# Patient Record
Sex: FEMALE | Race: BLACK OR AFRICAN AMERICAN | NOT HISPANIC OR LATINO | Employment: PART TIME | URBAN - METROPOLITAN AREA
[De-identification: names, ages, dates, MRNs, and addresses within clinical notes are randomized per-mention and may not be internally consistent; named-entity substitution may affect disease eponyms.]

---

## 2018-05-14 ENCOUNTER — HISTORICAL (OUTPATIENT)
Dept: LAB | Facility: HOSPITAL | Age: 18
End: 2018-05-14

## 2018-05-14 LAB
B-HCG SERPL QL: NEGATIVE
HIV 1+2 AB+HIV1 P24 AG SERPL QL IA: NEGATIVE
T PALLIDUM AB SER QL: NONREACTIVE

## 2019-11-15 ENCOUNTER — HISTORICAL (OUTPATIENT)
Dept: ADMINISTRATIVE | Facility: HOSPITAL | Age: 19
End: 2019-11-15

## 2021-07-27 ENCOUNTER — HISTORICAL (OUTPATIENT)
Dept: LAB | Facility: HOSPITAL | Age: 21
End: 2021-07-27

## 2021-07-27 LAB
ABS NEUT (OLG): 2.5 X10(3)/MCL (ref 1.5–6.9)
ALBUMIN SERPL-MCNC: 4 GM/DL (ref 3.5–5)
ALBUMIN/GLOB SERPL: 1.1 RATIO (ref 1.1–2)
ALP SERPL-CCNC: 95 UNIT/L (ref 40–150)
ALT SERPL-CCNC: 15 UNIT/L (ref 0–55)
APPEARANCE, UA: CLEAR
AST SERPL-CCNC: 20 UNIT/L (ref 5–34)
BASOPHILS # BLD AUTO: 0 X10(3)/MCL (ref 0–0.1)
BASOPHILS NFR BLD AUTO: 0 % (ref 0–1)
BILIRUB SERPL-MCNC: 0.5 MG/DL
BILIRUB UR QL STRIP: NEGATIVE
BILIRUBIN DIRECT+TOT PNL SERPL-MCNC: 0.2 MG/DL (ref 0–0.5)
BILIRUBIN DIRECT+TOT PNL SERPL-MCNC: 0.3 MG/DL (ref 0–0.8)
BUN SERPL-MCNC: 8 MG/DL (ref 7–18.7)
CALCIUM SERPL-MCNC: 9.8 MG/DL (ref 8.4–10.2)
CHLORIDE SERPL-SCNC: 105 MMOL/L (ref 98–107)
CHOLEST SERPL-MCNC: 150 MG/DL
CHOLEST/HDLC SERPL: 3 {RATIO} (ref 0–5)
CO2 SERPL-SCNC: 29 MMOL/L (ref 22–29)
COLOR UR: YELLOW
CREAT SERPL-MCNC: 0.78 MG/DL (ref 0.55–1.02)
DEPRECATED CALCIDIOL+CALCIFEROL SERPL-MC: 27.3 NG/ML (ref 30–80)
EOSINOPHIL # BLD AUTO: 0.2 X10(3)/MCL (ref 0–0.6)
EOSINOPHIL NFR BLD AUTO: 4 % (ref 0–5)
ERYTHROCYTE [DISTWIDTH] IN BLOOD BY AUTOMATED COUNT: 13 % (ref 11.5–17)
GLOBULIN SER-MCNC: 3.7 GM/DL (ref 2.4–3.5)
GLUCOSE (UA): NEGATIVE MG/DL
GLUCOSE SERPL-MCNC: 92 MG/DL (ref 74–100)
HCT VFR BLD AUTO: 39.8 % (ref 36–48)
HDLC SERPL-MCNC: 46 MG/DL (ref 35–60)
HGB BLD-MCNC: 12.8 GM/DL (ref 12–16)
HGB UR QL STRIP: NEGATIVE
IMM GRANULOCYTES # BLD AUTO: 0.01 10*3/UL (ref 0–0.02)
IMM GRANULOCYTES NFR BLD AUTO: 0.2 % (ref 0–0.43)
KETONES UR QL STRIP: NEGATIVE MG/DL
LDLC SERPL CALC-MCNC: 88 MG/DL (ref 50–140)
LEUKOCYTE ESTERASE UR QL STRIP: NEGATIVE
LYMPHOCYTES # BLD AUTO: 2.5 X10(3)/MCL (ref 0.5–4.1)
LYMPHOCYTES NFR BLD AUTO: 43 % (ref 15–40)
MCH RBC QN AUTO: 28 PG (ref 27–34)
MCHC RBC AUTO-ENTMCNC: 32 GM/DL (ref 31–36)
MCV RBC AUTO: 87 FL (ref 80–99)
MONOCYTES # BLD AUTO: 0.5 X10(3)/MCL (ref 0–1.1)
MONOCYTES NFR BLD AUTO: 8 % (ref 4–12)
NEUTROPHILS # BLD AUTO: 2.5 X10(3)/MCL (ref 1.5–6.9)
NEUTROPHILS NFR BLD AUTO: 44 % (ref 43–75)
NITRITE UR QL STRIP: NEGATIVE
PH UR STRIP: 7 [PH] (ref 4.6–8)
PLATELET # BLD AUTO: 293 X10(3)/MCL (ref 140–400)
PMV BLD AUTO: 11.2 FL (ref 6.8–10)
POTASSIUM SERPL-SCNC: 3.9 MMOL/L (ref 3.5–5.1)
PROT SERPL-MCNC: 7.7 GM/DL (ref 6.4–8.3)
PROT UR QL STRIP: NEGATIVE MG/DL
RBC # BLD AUTO: 4.56 X10(6)/MCL (ref 4.2–5.4)
SODIUM SERPL-SCNC: 138 MMOL/L (ref 136–145)
SP GR UR STRIP: 1.02 (ref 1–1.03)
TRIGL SERPL-MCNC: 79 MG/DL (ref 37–140)
TSH SERPL-ACNC: 1.36 UIU/ML (ref 0.35–4.94)
UROBILINOGEN UR STRIP-ACNC: 2 EU/DL
VLDLC SERPL CALC-MCNC: 16 MG/DL
WBC # SPEC AUTO: 5.8 X10(3)/MCL (ref 4.5–11.5)

## 2022-07-12 ENCOUNTER — HOSPITAL ENCOUNTER (EMERGENCY)
Facility: HOSPITAL | Age: 22
Discharge: HOME | End: 2022-07-13
Attending: STUDENT IN AN ORGANIZED HEALTH CARE EDUCATION/TRAINING PROGRAM
Payer: MEDICAID

## 2022-07-12 DIAGNOSIS — F41.9 ANXIETY: Primary | ICD-10-CM

## 2022-07-12 LAB
ALBUMIN SERPL-MCNC: 3.7 GM/DL (ref 3.5–5)
ALBUMIN/GLOB SERPL: 0.8 RATIO (ref 1.1–2)
ALP SERPL-CCNC: 70 UNIT/L (ref 40–150)
ALT SERPL-CCNC: 10 UNIT/L (ref 0–55)
APPEARANCE UR: CLEAR
AST SERPL-CCNC: 16 UNIT/L (ref 5–34)
B-HCG SERPL QL: NEGATIVE
BACTERIA #/AREA URNS AUTO: ABNORMAL /HPF
BASOPHILS # BLD AUTO: 0.03 X10(3)/MCL (ref 0–0.2)
BASOPHILS NFR BLD AUTO: 0.4 %
BILIRUB UR QL STRIP.AUTO: NEGATIVE MG/DL
BILIRUBIN DIRECT+TOT PNL SERPL-MCNC: 0.2 MG/DL
BUN SERPL-MCNC: 10 MG/DL (ref 7–18.7)
CALCIUM SERPL-MCNC: 9.7 MG/DL (ref 8.4–10.2)
CHLORIDE SERPL-SCNC: 105 MMOL/L (ref 98–107)
CO2 SERPL-SCNC: 22 MMOL/L (ref 22–29)
COLOR UR AUTO: YELLOW
CREAT SERPL-MCNC: 1.03 MG/DL (ref 0.55–1.02)
D DIMER PPP IA.FEU-MCNC: 1.22 UG/ML FEU (ref 0–0.5)
EOSINOPHIL # BLD AUTO: 0.17 X10(3)/MCL (ref 0–0.9)
EOSINOPHIL NFR BLD AUTO: 2.4 %
ERYTHROCYTE [DISTWIDTH] IN BLOOD BY AUTOMATED COUNT: 13.2 % (ref 11.5–17)
GLOBULIN SER-MCNC: 4.4 GM/DL (ref 2.4–3.5)
GLUCOSE SERPL-MCNC: 107 MG/DL (ref 74–100)
GLUCOSE UR QL STRIP.AUTO: NEGATIVE MG/DL
HCT VFR BLD AUTO: 37.3 % (ref 37–47)
HGB BLD-MCNC: 12.1 GM/DL (ref 12–16)
IMM GRANULOCYTES # BLD AUTO: 0.01 X10(3)/MCL (ref 0–0.04)
IMM GRANULOCYTES NFR BLD AUTO: 0.1 %
KETONES UR QL STRIP.AUTO: ABNORMAL MG/DL
LEUKOCYTE ESTERASE UR QL STRIP.AUTO: NEGATIVE UNIT/L
LYMPHOCYTES # BLD AUTO: 2.76 X10(3)/MCL (ref 0.6–4.6)
LYMPHOCYTES NFR BLD AUTO: 38.9 %
MCH RBC QN AUTO: 26.8 PG (ref 27–31)
MCHC RBC AUTO-ENTMCNC: 32.4 MG/DL (ref 33–36)
MCV RBC AUTO: 82.7 FL (ref 80–94)
MONOCYTES # BLD AUTO: 0.43 X10(3)/MCL (ref 0.1–1.3)
MONOCYTES NFR BLD AUTO: 6.1 %
MUCOUS THREADS URNS QL MICRO: ABNORMAL /LPF
NEUTROPHILS # BLD AUTO: 3.7 X10(3)/MCL (ref 2.1–9.2)
NEUTROPHILS NFR BLD AUTO: 52.1 %
NITRITE UR QL STRIP.AUTO: NEGATIVE
PH UR STRIP.AUTO: 6 [PH]
PLATELET # BLD AUTO: 313 X10(3)/MCL (ref 130–400)
PMV BLD AUTO: 11.3 FL (ref 7.4–10.4)
POTASSIUM SERPL-SCNC: 3.6 MMOL/L (ref 3.5–5.1)
PROT SERPL-MCNC: 8.1 GM/DL (ref 6.4–8.3)
PROT UR QL STRIP.AUTO: ABNORMAL MG/DL
RBC # BLD AUTO: 4.51 X10(6)/MCL (ref 4.2–5.4)
RBC #/AREA URNS AUTO: ABNORMAL /HPF
RBC UR QL AUTO: NEGATIVE UNIT/L
SODIUM SERPL-SCNC: 139 MMOL/L (ref 136–145)
SP GR UR STRIP.AUTO: 1.02
SQUAMOUS #/AREA URNS AUTO: ABNORMAL /HPF
TSH SERPL-ACNC: 1.14 UIU/ML (ref 0.35–4.94)
UROBILINOGEN UR STRIP-ACNC: 2 MG/DL
WBC # SPEC AUTO: 7.1 X10(3)/MCL (ref 4.5–11.5)
WBC #/AREA URNS AUTO: ABNORMAL /HPF

## 2022-07-12 PROCEDURE — 81025 URINE PREGNANCY TEST: CPT | Performed by: STUDENT IN AN ORGANIZED HEALTH CARE EDUCATION/TRAINING PROGRAM

## 2022-07-12 PROCEDURE — 85379 FIBRIN DEGRADATION QUANT: CPT | Performed by: STUDENT IN AN ORGANIZED HEALTH CARE EDUCATION/TRAINING PROGRAM

## 2022-07-12 PROCEDURE — 85025 COMPLETE CBC W/AUTO DIFF WBC: CPT | Performed by: STUDENT IN AN ORGANIZED HEALTH CARE EDUCATION/TRAINING PROGRAM

## 2022-07-12 PROCEDURE — 84443 ASSAY THYROID STIM HORMONE: CPT | Performed by: STUDENT IN AN ORGANIZED HEALTH CARE EDUCATION/TRAINING PROGRAM

## 2022-07-12 PROCEDURE — 80053 COMPREHEN METABOLIC PANEL: CPT | Performed by: STUDENT IN AN ORGANIZED HEALTH CARE EDUCATION/TRAINING PROGRAM

## 2022-07-12 PROCEDURE — 63600175 PHARM REV CODE 636 W HCPCS: Performed by: STUDENT IN AN ORGANIZED HEALTH CARE EDUCATION/TRAINING PROGRAM

## 2022-07-12 PROCEDURE — 99285 EMERGENCY DEPT VISIT HI MDM: CPT | Mod: 25

## 2022-07-12 PROCEDURE — 81001 URINALYSIS AUTO W/SCOPE: CPT | Performed by: STUDENT IN AN ORGANIZED HEALTH CARE EDUCATION/TRAINING PROGRAM

## 2022-07-12 PROCEDURE — 36415 COLL VENOUS BLD VENIPUNCTURE: CPT | Performed by: STUDENT IN AN ORGANIZED HEALTH CARE EDUCATION/TRAINING PROGRAM

## 2022-07-12 RX ADMIN — SODIUM CHLORIDE, POTASSIUM CHLORIDE, SODIUM LACTATE AND CALCIUM CHLORIDE 1000 ML: 600; 310; 30; 20 INJECTION, SOLUTION INTRAVENOUS at 11:07

## 2022-07-13 VITALS
RESPIRATION RATE: 21 BRPM | SYSTOLIC BLOOD PRESSURE: 131 MMHG | HEIGHT: 66 IN | TEMPERATURE: 99 F | BODY MASS INDEX: 26.52 KG/M2 | DIASTOLIC BLOOD PRESSURE: 72 MMHG | HEART RATE: 93 BPM | WEIGHT: 165 LBS | OXYGEN SATURATION: 99 %

## 2022-07-13 PROCEDURE — 25500020 PHARM REV CODE 255: Performed by: STUDENT IN AN ORGANIZED HEALTH CARE EDUCATION/TRAINING PROGRAM

## 2022-07-13 RX ORDER — HYDROXYZINE PAMOATE 25 MG/1
25 CAPSULE ORAL EVERY 6 HOURS PRN
Qty: 20 CAPSULE | Refills: 0 | Status: SHIPPED | OUTPATIENT
Start: 2022-07-13

## 2022-07-13 RX ADMIN — IOPAMIDOL 100 ML: 755 INJECTION, SOLUTION INTRAVENOUS at 12:07

## 2022-07-13 NOTE — ED PROVIDER NOTES
Encounter Date: 7/12/2022       History     Chief Complaint   Patient presents with    Shortness of Breath     Patient experiencing off and on SOB for 1 month. Patient suspects anxiety.      The history is provided by the patient.   Shortness of Breath  This is a recurrent problem. The problem occurs frequently.The current episode started more than 1 week ago. The problem has not changed since onset.Pertinent negatives include no fever, no cough, no wheezing, no chest pain, no vomiting and no abdominal pain. The problem's precipitants include emotional upset. She has tried nothing for the symptoms. The treatment provided no relief. She has had no prior ED visits.       22-year-old female with a past history of anxiety presents emergency department for chest tightness and shortness of breath.  Patient states she thinks is her anxiety although is concerned.  States that it happens intermittently it has been going on for last few months.  Denies any chest pain.  No abdominal pain, nausea vomiting or diarrhea.  She is on birth control patches.    Review of patient's allergies indicates:  No Known Allergies  Past Medical History:   Diagnosis Date    Anxiety disorder, unspecified     Insomnia      History reviewed. No pertinent surgical history.  History reviewed. No pertinent family history.  Social History     Tobacco Use    Smoking status: Never Smoker    Smokeless tobacco: Never Used   Substance Use Topics    Alcohol use: Never    Drug use: Yes     Frequency: 2.0 times per week     Types: Marijuana     Review of Systems   Constitutional: Negative for fever.   Respiratory: Positive for shortness of breath. Negative for cough and wheezing.    Cardiovascular: Negative for chest pain.   Gastrointestinal: Negative for abdominal pain and vomiting.   Psychiatric/Behavioral: The patient is nervous/anxious.    All other systems reviewed and are negative.      Physical Exam     Initial Vitals [07/12/22 2152]   BP Pulse  Resp Temp SpO2   (!) 150/86 (!) 121 18 99 °F (37.2 °C) 100 %      MAP       --         Physical Exam    Nursing note and vitals reviewed.  Constitutional: She appears well-developed and well-nourished. No distress.   Cardiovascular: Normal rate and regular rhythm.   Pulmonary/Chest: Breath sounds normal. No respiratory distress. She exhibits no tenderness.   Abdominal: Abdomen is soft. Bowel sounds are normal. There is no abdominal tenderness.   Musculoskeletal:         General: Normal range of motion.     Neurological: She is alert and oriented to person, place, and time. GCS score is 15. GCS eye subscore is 4. GCS verbal subscore is 5. GCS motor subscore is 6.   Skin: Skin is warm. Capillary refill takes less than 2 seconds.   Psychiatric: She has a normal mood and affect. Thought content normal.         ED Course   Procedures  Labs Reviewed   COMPREHENSIVE METABOLIC PANEL - Abnormal; Notable for the following components:       Result Value    Glucose Level 107 (*)     Creatinine 1.03 (*)     Globulin 4.4 (*)     Albumin/Globulin Ratio 0.8 (*)     All other components within normal limits   URINALYSIS, REFLEX TO URINE CULTURE - Abnormal; Notable for the following components:    Protein, UA Trace (*)     Ketones, UA Trace (*)     Urobilinogen, UA 2.0 (*)     All other components within normal limits   D DIMER, QUANTITATIVE - Abnormal; Notable for the following components:    D-Dimer 1.22 (*)     All other components within normal limits   CBC WITH DIFFERENTIAL - Abnormal; Notable for the following components:    MCH 26.8 (*)     MCHC 32.4 (*)     MPV 11.3 (*)     All other components within normal limits   URINALYSIS, MICROSCOPIC - Abnormal; Notable for the following components:    Bacteria, UA Few (*)     Mucous, UA Small (*)     Squamous Epithelial Cells, UA Moderate (*)     All other components within normal limits   PREGNANCY TEST, URINE RAPID - Normal   TSH - Normal   CBC W/ AUTO DIFFERENTIAL    Narrative:      The following orders were created for panel order CBC auto differential.  Procedure                               Abnormality         Status                     ---------                               -----------         ------                     CBC with Differential[[id number]]        Abnormal            Final result                 Please view results for these tests on the individual orders.          Imaging Results          CTA Chest Non-Coronary - PE Study (Preliminary result)  Result time 07/13/22 00:27:28    Preliminary result by Roverto Garrett Jr., MD (07/13/22 00:27:28)                 Narrative:    START OF REPORT:  Technique: CT Scan of the chest was performed with intravenous contrast with direct axial images as well as sagittal and coronal reconstruction images.    Dosage Information: Automated Exposure Control was utilized.    Comparison: None.    Clinical History: Chest pain, SOB.    Findings:  Contrast: The contrast bolus is somewhat sub-optimal which decreases sensitivity and specificity for subtle distal peripheral pulmonary emboli.  Mediastinum: The mediastinal structures are within normal limits.  Heart: The heart appears unremarkable.  Aorta: Unremarkable appearing aorta.  Pulmonary Arteries: No filling defects are seen in the pulmonary arteries to suggest pulmonary embolus to the sensitivity of the study.  Lungs: The lungs are clear with no focal infiltrate or airspace disease.  Pleura: No effusions or pneumothorax are identified.  Bony Structures: The visualized bony structures appear unremarkable.  Abdomen: The visualized upper abdominal organs appear unremarkable.      Impression:  1. No filling defects are seen in the pulmonary arteries to suggest pulmonary embolus to the sensitivity of the study.  2. No acute intrathoracic process identified. Details as above.                                 X-Ray Chest AP Portable (Preliminary result)  Result time 07/12/22 22:29:24    ED  Interpretation by Jeovanny Flower MD (07/12/22 22:29:24, [facility]  Emergency Dept, Emergency Medicine)    Lung clean with no consolidations.  No pneumothorax subcutaneous emphysema.  Heart size is normal limits.                               Medications   lactated ringers bolus 1,000 mL (1,000 mLs Intravenous New Bag 7/12/22 1952)   iopamidoL (ISOVUE-370) injection 100 mL (100 mLs Intravenous Given 7/13/22 0027)     Medical Decision Making:   Differential Diagnosis:     Anxiety, PE             ED Course as of 07/13/22 0110   Wed Jul 13, 2022   0107 Patient resting comfortably in bed no acute distress.  Vital signs are stable and patient is along tachycardic.  Laboratory results all within normal limits except her elevated D-dimer.  CTA was performed with no evidence of PE.  Patient states that she no longer feels anxious is ready go home.  I informed I will give her some Vistaril to take as needed and she is to follow-up with her PCP to get on some type of anti anxiety medications and she understands.  Strict ER precautions given patient states full understanding. [BS]      ED Course User Index  [BS] Jeovanny Flower MD             Clinical Impression:   Final diagnoses:  [F41.9] Anxiety (Primary)          ED Disposition Condition    Discharge Stable        ED Prescriptions     Medication Sig Dispense Start Date End Date Auth. Provider    hydrOXYzine pamoate (VISTARIL) 25 MG Cap Take 1 capsule (25 mg total) by mouth every 6 (six) hours as needed (Anxiety). 20 capsule 7/13/2022  Jeovanny Flower MD        Follow-up Information     Follow up With Specialties Details Why Contact Info    Vivi Miller MD Family Medicine Schedule an appointment as soon as possible for a visit   [address] [place] [address]  [phone]      [facility] - Emergency Dept Emergency Medicine Go to  If symptoms worsen [address]  [facility] [address] [phone]           Jeovanny FARIAS  MD [place]  07/13/22 0110

## 2022-09-28 ENCOUNTER — LAB VISIT (OUTPATIENT)
Dept: LAB | Facility: HOSPITAL | Age: 22
End: 2022-09-28
Attending: NURSE PRACTITIONER
Payer: MEDICAID

## 2022-09-28 DIAGNOSIS — Z79.899 ENCOUNTER FOR LONG-TERM (CURRENT) USE OF OTHER MEDICATIONS: Primary | ICD-10-CM

## 2022-09-28 LAB
ALBUMIN SERPL-MCNC: 3.9 GM/DL (ref 3.5–5)
ALBUMIN/GLOB SERPL: 1 RATIO (ref 1.1–2)
ALP SERPL-CCNC: 85 UNIT/L (ref 40–150)
ALT SERPL-CCNC: 14 UNIT/L (ref 0–55)
AST SERPL-CCNC: 17 UNIT/L (ref 5–34)
BASOPHILS # BLD AUTO: 0.04 X10(3)/MCL (ref 0–0.2)
BASOPHILS NFR BLD AUTO: 0.5 %
BILIRUBIN DIRECT+TOT PNL SERPL-MCNC: 0.6 MG/DL
BUN SERPL-MCNC: 10 MG/DL (ref 7–18.7)
CALCIUM SERPL-MCNC: 9.9 MG/DL (ref 8.4–10.2)
CHLORIDE SERPL-SCNC: 104 MMOL/L (ref 98–107)
CHOLEST SERPL-MCNC: 186 MG/DL
CHOLEST/HDLC SERPL: 3 {RATIO} (ref 0–5)
CO2 SERPL-SCNC: 27 MMOL/L (ref 22–29)
CREAT SERPL-MCNC: 1.01 MG/DL (ref 0.55–1.02)
EOSINOPHIL # BLD AUTO: 0.31 X10(3)/MCL (ref 0–0.9)
EOSINOPHIL NFR BLD AUTO: 4.1 %
ERYTHROCYTE [DISTWIDTH] IN BLOOD BY AUTOMATED COUNT: 13.5 % (ref 11.5–17)
GFR SERPLBLD CREATININE-BSD FMLA CKD-EPI: >60 MLS/MIN/1.73/M2
GLOBULIN SER-MCNC: 3.9 GM/DL (ref 2.4–3.5)
GLUCOSE SERPL-MCNC: 81 MG/DL (ref 74–100)
HCT VFR BLD AUTO: 39.1 % (ref 37–47)
HDLC SERPL-MCNC: 59 MG/DL (ref 35–60)
HGB BLD-MCNC: 12.5 GM/DL (ref 12–16)
IMM GRANULOCYTES # BLD AUTO: 0.02 X10(3)/MCL (ref 0–0.04)
IMM GRANULOCYTES NFR BLD AUTO: 0.3 %
LDLC SERPL CALC-MCNC: 110 MG/DL (ref 50–140)
LYMPHOCYTES # BLD AUTO: 3.47 X10(3)/MCL (ref 0.6–4.6)
LYMPHOCYTES NFR BLD AUTO: 45.7 %
MCH RBC QN AUTO: 27 PG (ref 27–31)
MCHC RBC AUTO-ENTMCNC: 32 MG/DL (ref 33–36)
MCV RBC AUTO: 84.4 FL (ref 80–94)
MONOCYTES # BLD AUTO: 0.49 X10(3)/MCL (ref 0.1–1.3)
MONOCYTES NFR BLD AUTO: 6.4 %
NEUTROPHILS # BLD AUTO: 3.3 X10(3)/MCL (ref 2.1–9.2)
NEUTROPHILS NFR BLD AUTO: 43 %
PLATELET # BLD AUTO: 331 X10(3)/MCL (ref 130–400)
PMV BLD AUTO: 11.8 FL (ref 7.4–10.4)
POTASSIUM SERPL-SCNC: 4.2 MMOL/L (ref 3.5–5.1)
PROT SERPL-MCNC: 7.8 GM/DL (ref 6.4–8.3)
RBC # BLD AUTO: 4.63 X10(6)/MCL (ref 4.2–5.4)
SODIUM SERPL-SCNC: 138 MMOL/L (ref 136–145)
TRIGL SERPL-MCNC: 87 MG/DL (ref 37–140)
TSH SERPL-ACNC: 1.98 UIU/ML (ref 0.35–4.94)
VLDLC SERPL CALC-MCNC: 17 MG/DL
WBC # SPEC AUTO: 7.6 X10(3)/MCL (ref 4.5–11.5)

## 2022-09-28 PROCEDURE — 80365 DRUG SCREENING OXYCODONE: CPT

## 2022-09-28 PROCEDURE — 36415 COLL VENOUS BLD VENIPUNCTURE: CPT

## 2022-09-28 PROCEDURE — 80053 COMPREHEN METABOLIC PANEL: CPT

## 2022-09-28 PROCEDURE — 84443 ASSAY THYROID STIM HORMONE: CPT

## 2022-09-28 PROCEDURE — 80061 LIPID PANEL: CPT

## 2022-09-28 PROCEDURE — 85025 COMPLETE CBC W/AUTO DIFF WBC: CPT

## 2022-10-10 LAB — MAYO GENERIC ORDERABLE RESULT: NORMAL

## 2022-12-03 ENCOUNTER — HOSPITAL ENCOUNTER (EMERGENCY)
Facility: HOSPITAL | Age: 22
Discharge: HOME | End: 2022-12-03
Attending: INTERNAL MEDICINE
Payer: MEDICAID

## 2022-12-03 VITALS
HEART RATE: 72 BPM | RESPIRATION RATE: 16 BRPM | OXYGEN SATURATION: 100 % | TEMPERATURE: 100 F | BODY MASS INDEX: 26.95 KG/M2 | WEIGHT: 167 LBS | SYSTOLIC BLOOD PRESSURE: 140 MMHG | DIASTOLIC BLOOD PRESSURE: 87 MMHG

## 2022-12-03 DIAGNOSIS — K52.9 GASTROENTERITIS: Primary | ICD-10-CM

## 2022-12-03 LAB
ALBUMIN SERPL-MCNC: 3.7 GM/DL (ref 3.5–5)
ALBUMIN/GLOB SERPL: 0.9 RATIO (ref 1.1–2)
ALP SERPL-CCNC: 67 UNIT/L (ref 40–150)
ALT SERPL-CCNC: 9 UNIT/L (ref 0–55)
AMYLASE SERPL-CCNC: 77 UNIT/L (ref 25–125)
APPEARANCE UR: CLEAR
AST SERPL-CCNC: 16 UNIT/L (ref 5–34)
B-HCG SERPL QL: NEGATIVE
BACTERIA #/AREA URNS AUTO: ABNORMAL /HPF
BASOPHILS # BLD AUTO: 0.02 X10(3)/MCL (ref 0–0.2)
BASOPHILS NFR BLD AUTO: 0.3 %
BILIRUB UR QL STRIP.AUTO: NEGATIVE MG/DL
BILIRUBIN DIRECT+TOT PNL SERPL-MCNC: 0.5 MG/DL
BUN SERPL-MCNC: 8 MG/DL (ref 7–18.7)
CALCIUM SERPL-MCNC: 9.4 MG/DL (ref 8.4–10.2)
CHLORIDE SERPL-SCNC: 106 MMOL/L (ref 98–107)
CO2 SERPL-SCNC: 27 MMOL/L (ref 22–29)
COLOR UR AUTO: YELLOW
CREAT SERPL-MCNC: 0.86 MG/DL (ref 0.55–1.02)
EOSINOPHIL # BLD AUTO: 0.18 X10(3)/MCL (ref 0–0.9)
EOSINOPHIL NFR BLD AUTO: 2.5 %
ERYTHROCYTE [DISTWIDTH] IN BLOOD BY AUTOMATED COUNT: 12.9 % (ref 11.5–17)
FLUAV AG UPPER RESP QL IA.RAPID: NOT DETECTED
FLUBV AG UPPER RESP QL IA.RAPID: NOT DETECTED
GFR SERPLBLD CREATININE-BSD FMLA CKD-EPI: >60 MLS/MIN/1.73/M2
GLOBULIN SER-MCNC: 4.2 GM/DL (ref 2.4–3.5)
GLUCOSE SERPL-MCNC: 78 MG/DL (ref 74–100)
GLUCOSE UR QL STRIP.AUTO: NEGATIVE MG/DL
HCT VFR BLD AUTO: 35.4 % (ref 37–47)
HGB BLD-MCNC: 11.7 GM/DL (ref 12–16)
IMM GRANULOCYTES # BLD AUTO: 0.02 X10(3)/MCL (ref 0–0.04)
IMM GRANULOCYTES NFR BLD AUTO: 0.3 %
KETONES UR QL STRIP.AUTO: NEGATIVE MG/DL
LEUKOCYTE ESTERASE UR QL STRIP.AUTO: ABNORMAL UNIT/L
LIPASE SERPL-CCNC: 43 U/L
LYMPHOCYTES # BLD AUTO: 2.61 X10(3)/MCL (ref 0.6–4.6)
LYMPHOCYTES NFR BLD AUTO: 35.9 %
MCH RBC QN AUTO: 27.5 PG (ref 27–31)
MCHC RBC AUTO-ENTMCNC: 33.1 MG/DL (ref 33–36)
MCV RBC AUTO: 83.1 FL (ref 80–94)
MONOCYTES # BLD AUTO: 0.53 X10(3)/MCL (ref 0.1–1.3)
MONOCYTES NFR BLD AUTO: 7.3 %
MUCOUS THREADS URNS QL MICRO: ABNORMAL /LPF
NEUTROPHILS # BLD AUTO: 3.9 X10(3)/MCL (ref 2.1–9.2)
NEUTROPHILS NFR BLD AUTO: 53.7 %
NITRITE UR QL STRIP.AUTO: NEGATIVE
PH UR STRIP.AUTO: 6.5 [PH]
PLATELET # BLD AUTO: 316 X10(3)/MCL (ref 130–400)
PMV BLD AUTO: 11.2 FL (ref 7.4–10.4)
POTASSIUM SERPL-SCNC: 3.6 MMOL/L (ref 3.5–5.1)
PROT SERPL-MCNC: 7.9 GM/DL (ref 6.4–8.3)
PROT UR QL STRIP.AUTO: NEGATIVE MG/DL
RBC # BLD AUTO: 4.26 X10(6)/MCL (ref 4.2–5.4)
RBC #/AREA URNS AUTO: ABNORMAL /HPF
RBC UR QL AUTO: NEGATIVE UNIT/L
SARS-COV-2 RNA RESP QL NAA+PROBE: NOT DETECTED
SODIUM SERPL-SCNC: 139 MMOL/L (ref 136–145)
SP GR UR STRIP.AUTO: 1.02
SQUAMOUS #/AREA URNS AUTO: ABNORMAL /HPF
UROBILINOGEN UR STRIP-ACNC: 2 MG/DL
WBC # SPEC AUTO: 7.3 X10(3)/MCL (ref 4.5–11.5)
WBC #/AREA URNS AUTO: ABNORMAL /HPF

## 2022-12-03 PROCEDURE — 99285 EMERGENCY DEPT VISIT HI MDM: CPT | Mod: 25

## 2022-12-03 PROCEDURE — 63600175 PHARM REV CODE 636 W HCPCS: Performed by: NURSE PRACTITIONER

## 2022-12-03 PROCEDURE — 83690 ASSAY OF LIPASE: CPT | Performed by: NURSE PRACTITIONER

## 2022-12-03 PROCEDURE — 81025 URINE PREGNANCY TEST: CPT | Performed by: NURSE PRACTITIONER

## 2022-12-03 PROCEDURE — 85025 COMPLETE CBC W/AUTO DIFF WBC: CPT | Performed by: NURSE PRACTITIONER

## 2022-12-03 PROCEDURE — 96374 THER/PROPH/DIAG INJ IV PUSH: CPT

## 2022-12-03 PROCEDURE — 81001 URINALYSIS AUTO W/SCOPE: CPT | Performed by: NURSE PRACTITIONER

## 2022-12-03 PROCEDURE — 25000003 PHARM REV CODE 250: Performed by: NURSE PRACTITIONER

## 2022-12-03 PROCEDURE — 82150 ASSAY OF AMYLASE: CPT | Performed by: NURSE PRACTITIONER

## 2022-12-03 PROCEDURE — 80053 COMPREHEN METABOLIC PANEL: CPT | Performed by: NURSE PRACTITIONER

## 2022-12-03 PROCEDURE — 0240U COVID/FLU A&B PCR: CPT | Performed by: NURSE PRACTITIONER

## 2022-12-03 PROCEDURE — 81003 URINALYSIS AUTO W/O SCOPE: CPT | Performed by: NURSE PRACTITIONER

## 2022-12-03 PROCEDURE — 96361 HYDRATE IV INFUSION ADD-ON: CPT

## 2022-12-03 RX ORDER — ONDANSETRON 4 MG/1
4 TABLET, FILM COATED ORAL EVERY 6 HOURS
Qty: 12 TABLET | Refills: 0 | Status: SHIPPED | OUTPATIENT
Start: 2022-12-03

## 2022-12-03 RX ORDER — ONDANSETRON 2 MG/ML
4 INJECTION INTRAMUSCULAR; INTRAVENOUS
Status: COMPLETED | OUTPATIENT
Start: 2022-12-03 | End: 2022-12-03

## 2022-12-03 RX ADMIN — ONDANSETRON 4 MG: 2 INJECTION INTRAMUSCULAR; INTRAVENOUS at 03:12

## 2022-12-03 RX ADMIN — SODIUM CHLORIDE 1000 ML: 9 INJECTION, SOLUTION INTRAVENOUS at 03:12

## 2022-12-03 NOTE — ED PROVIDER NOTES
2022         3:49 PM    Source of History:  History obtained from the patient.       Chief complaint:  From Nurse Triage:  Vomiting (C/o N/V/D x 3 days)    HISTORY OF PRESENT ILLNES:  Tony Galindo is a 22 y.o. female presenting with Vomiting (C/o N/V/D x 3 days)       Patient with history of depression anxiety ate some barbecue from her father this Thursday and started having nausea vomiting and diarrhea, next day she was still having nausea vomiting diarrhea and this morning still having diarrhea and she can not eat or drink anything so decided to come to the emergency room.    REVIEW OF SYSTEMS:   Constitutional symptoms:  No Fever. No Chills    Skin symptoms:  No Rash.    Eye symptoms:  No Visual disturbance reported.   ENMT symptoms:  No Sore throat,    Respiratory symptoms:  No Shortness of Breath, no Cough, no Wheezing.    Cardiovascular symptoms:  No Chest Pain, No Palpitations, No Tachycardia.    Gastrointestinal symptoms:  No Abdominal Pain, nausea vomiting and diarrhea after eating barbecue  Genitourinary symptoms:  No Dysuria,    Musculoskeletal symptoms:  No Back pain,    Neurologic symptoms:  No Headache, No Dizziness.    Psychiatric symptoms:  No Anxiety, No Depression, No Substance Abuse.              Additional review of systems information: Patient Denies Any Other Complaints.    All Other Systems Reviewed With Patient And Negative.    ALLEGIES:  Review of patient's allergies indicates:  No Known Allergies    MEDICINE LIST:  Current Outpatient Medications   Medication Instructions    hydrOXYzine pamoate (VISTARIL) 25 mg, Oral, Every 6 hours PRN    ondansetron (ZOFRAN) 4 mg, Oral, Every 6 hours        PMH:  As per HPI and below:    Reviewed and updated in chart.    PAST MEDICAL HISTORY:  Past Medical History:   Diagnosis Date    Anxiety disorder, unspecified     Depression     Insomnia         PAST SURGICAL HISTORY:  Past Surgical History:   Procedure Laterality Date     SECTION          SOCIAL HISTORY:  Social History     Tobacco Use    Smoking status: Former     Packs/day: 0.50     Types: Cigarettes    Smokeless tobacco: Never   Substance Use Topics    Alcohol use: Not Currently    Drug use: Yes     Frequency: 2.0 times per week     Types: Marijuana       FAMILY HISTORY:  Family History   Problem Relation Age of Onset    Hypertension Mother     Hypertension Father     Diabetes Father         PROBLEM LIST:  There is no problem list on file for this patient.       PHYSICAL EXAM:      ED Triage Vitals [12/03/22 1451]   BP (!) 150/98   Pulse 106   Resp 18   Temp 99.6 °F (37.6 °C)   SpO2 100 %        Vital Signs: Reviewed As In Chart.  General:  Alert, No Cardiorespiratory Distress Noted.   Skin: Normal For Ethnic Origin  Eye:  Extraocular Movements Are Intact.   ENT: Mucus membranes are moist.   Cardiovascular:  Regular Rate And Rhythm, No Murmur, No Pedal Edema.    Respiratory:  Respirations Nonlabored, No Respiratory Distress, Good Bilateral Air Entry, No Rales, No Rhonchi.    Musculoskeletal:  No Gross Deformity Noted.   Gastrointestinal:  Soft, Non Distended, Non Tenderness, Normal Bowel Sounds.    Neurological:  Alert And Oriented To Person, Place, Time, And Situation, Normal Motor Observed, Normal Speech Observed.    Psychiatric:  Cooperative, Appropriate Mood & Affect.    INITIAL IMPRESSION/ DIFFERENTIAL DX:      MEDICAL DECISION MAKING:      Reviewed Nurses Note. Reviewed Vital Signs.     Reviewed Pertinent old records, History and updated as necessary.    22 y.o. female with Vomiting (C/o N/V/D x 3 days)    Patient with nausea vomiting and diarrhea after eating barbecue, her examination does not have any major abnormality, will give her IV fluids and do blood work and decide further.    ED WORKUP AND COURSE:  ED ORDERS:  Orders Placed This Encounter   Procedures    CBC auto differential    Comprehensive metabolic panel    COVID/FLU A&B PCR    Amylase    Lipase    Urinalysis, Reflex to  Urine Culture Urine, Clean Catch    HCG Qualitative Urine    CBC with Differential    Urinalysis, Microscopic    Insert Saline lock IV       ED MEDICINES:  Medications   sodium chloride 0.9% bolus 1,000 mL (1,000 mLs Intravenous New Bag 12/3/22 1511)   ondansetron injection 4 mg (4 mg Intravenous Given 12/3/22 1510)                ED LABS ORDERED AND REVIEWED:  Admission on 12/03/2022   Component Date Value Ref Range Status    Sodium Level 12/03/2022 139  136 - 145 mmol/L Final    Potassium Level 12/03/2022 3.6  3.5 - 5.1 mmol/L Final    Chloride 12/03/2022 106  98 - 107 mmol/L Final    Carbon Dioxide 12/03/2022 27  22 - 29 mmol/L Final    Glucose Level 12/03/2022 78  74 - 100 mg/dL Final    Blood Urea Nitrogen 12/03/2022 8.0  7.0 - 18.7 mg/dL Final    Creatinine 12/03/2022 0.86  0.55 - 1.02 mg/dL Final    Calcium Level Total 12/03/2022 9.4  8.4 - 10.2 mg/dL Final    Protein Total 12/03/2022 7.9  6.4 - 8.3 gm/dL Final    Albumin Level 12/03/2022 3.7  3.5 - 5.0 gm/dL Final    Globulin 12/03/2022 4.2 (H)  2.4 - 3.5 gm/dL Final    Albumin/Globulin Ratio 12/03/2022 0.9 (L)  1.1 - 2.0 ratio Final    Bilirubin Total 12/03/2022 0.5  <=1.5 mg/dL Final    Alkaline Phosphatase 12/03/2022 67  40 - 150 unit/L Final    Alanine Aminotransferase 12/03/2022 9  0 - 55 unit/L Final    Aspartate Aminotransferase 12/03/2022 16  5 - 34 unit/L Final    eGFR 12/03/2022 >60  mls/min/1.73/m2 Final    Influenza A PCR 12/03/2022 Not Detected  Not Detected Final    Influenza B PCR 12/03/2022 Not Detected  Not Detected Final    SARS-CoV-2 PCR 12/03/2022 Not Detected  Not Detected Final    Amylase Level 12/03/2022 77  25 - 125 unit/L Final    Lipase Level 12/03/2022 43  <=60 U/L Final    Color, UA 12/03/2022 Yellow  Yellow, Light-Yellow, Dark Yellow, Regina, Straw Final    Appearance,  12/03/2022 Clear  Clear Final    Specific Gravity,  12/03/2022 1.025   Final    pH,  12/03/2022 6.5  5.0 - 8.5 Final    Protein,  12/03/2022 Negative   Negative mg/dL Final    Glucose, UA 12/03/2022 Negative  Negative, Normal mg/dL Final    Ketones, UA 12/03/2022 Negative  Negative mg/dL Final    Blood, UA 12/03/2022 Negative  Negative unit/L Final    Bilirubin, UA 12/03/2022 Negative  Negative mg/dL Final    Urobilinogen, UA 12/03/2022 2.0 (A)  0.2, 1.0, Normal mg/dL Final    Nitrites, UA 12/03/2022 Negative  Negative Final    Leukocyte Esterase, UA 12/03/2022 Trace (A)  Negative unit/L Final    Beta hCG Qualitative, Urine 12/03/2022 Negative  Negative Final    WBC 12/03/2022 7.3  4.5 - 11.5 x10(3)/mcL Final    RBC 12/03/2022 4.26  4.20 - 5.40 x10(6)/mcL Final    Hgb 12/03/2022 11.7 (L)  12.0 - 16.0 gm/dL Final    Hct 12/03/2022 35.4 (L)  37.0 - 47.0 % Final    MCV 12/03/2022 83.1  80.0 - 94.0 fL Final    MCH 12/03/2022 27.5  27.0 - 31.0 pg Final    MCHC 12/03/2022 33.1  33.0 - 36.0 mg/dL Final    RDW 12/03/2022 12.9  11.5 - 17.0 % Final    Platelet 12/03/2022 316  130 - 400 x10(3)/mcL Final    MPV 12/03/2022 11.2 (H)  7.4 - 10.4 fL Final    Neut % 12/03/2022 53.7  % Final    Lymph % 12/03/2022 35.9  % Final    Mono % 12/03/2022 7.3  % Final    Eos % 12/03/2022 2.5  % Final    Basophil % 12/03/2022 0.3  % Final    Lymph # 12/03/2022 2.61  0.6 - 4.6 x10(3)/mcL Final    Neut # 12/03/2022 3.9  2.1 - 9.2 x10(3)/mcL Final    Mono # 12/03/2022 0.53  0.1 - 1.3 x10(3)/mcL Final    Eos # 12/03/2022 0.18  0 - 0.9 x10(3)/mcL Final    Baso # 12/03/2022 0.02  0 - 0.2 x10(3)/mcL Final    IG# 12/03/2022 0.02  0 - 0.04 x10(3)/mcL Final    IG% 12/03/2022 0.3  % Final    Bacteria, UA 12/03/2022 Rare  None Seen, Rare, Occasional /HPF Final    Mucous, UA 12/03/2022 Small (A)  None Seen /LPF Final    RBC, UA 12/03/2022 0-2  None Seen, 0-2, 3-5, 0-5 /HPF Final    WBC, UA 12/03/2022 0-2  None Seen, 0-2, 3-5, 0-5 /HPF Final    Squamous Epithelial Cells, UA 12/03/2022 Few (A)  None Seen, Rare, Occasional, Occ /HPF Final       RADIOLOGY STUDIES ORDERED AND REVIEWED:  Imaging Results     None         ED COURSE AND REEVALUATIONS:  Vitals:    12/03/22 1451   BP: (!) 150/98   Pulse: 106   Resp: 18   Temp: 99.6 °F (37.6 °C)       PROCEDURES PERFORMED IN ED:  Procedures    ED Course as of 12/03/22 1620   Sat Dec 03, 2022   1619 Patient is doing better, essentially just has acute gastroenteritis, I will let her go home with instruction to see her family doctor for follow-up and take Zofran and drink plenty of liquids and see from there. [GQ]      ED Course User Index  [GQ] Terri Nieves MD              DIAGNOSTIC IMPRESSION:      1. Gastroenteritis         ED Disposition Condition    Discharge Stable               Medication List        START taking these medications      ondansetron 4 MG tablet  Commonly known as: ZOFRAN  Take 1 tablet (4 mg total) by mouth every 6 (six) hours.            ASK your doctor about these medications      hydrOXYzine pamoate 25 MG Cap  Commonly known as: VISTARIL  Take 1 capsule (25 mg total) by mouth every 6 (six) hours as needed (Anxiety).               Where to Get Your Medications        These medications were sent to [id number] - [place] - [address]  [address] [place] [address]      Phone: [phone]   ondansetron 4 MG tablet           Follow-up Information       Vivi Miller MD In 2 days.    Specialty: Family Medicine  Contact information:  [address] [place] [address]  [phone]                              ED Prescriptions       Medication Sig Dispense Start Date End Date Auth. Provider    ondansetron (ZOFRAN) 4 MG tablet Take 1 tablet (4 mg total) by mouth every 6 (six) hours. 12 tablet 12/3/2022 -- Terri Nieves MD          Follow-up Information       Follow up With Specialties Details Why Contact Info    Vivi Miller MD Family Medicine In 2 days  [address]  [place] [id number] [phone]                 Terri Nieves MD  12/03/22 1620

## 2023-05-11 ENCOUNTER — HOSPITAL ENCOUNTER (EMERGENCY)
Facility: HOSPITAL | Age: 23
Discharge: HOME | End: 2023-05-11
Attending: EMERGENCY MEDICINE
Payer: MEDICAID

## 2023-05-11 VITALS
OXYGEN SATURATION: 99 % | BODY MASS INDEX: 26.37 KG/M2 | SYSTOLIC BLOOD PRESSURE: 132 MMHG | WEIGHT: 168 LBS | DIASTOLIC BLOOD PRESSURE: 80 MMHG | HEIGHT: 67 IN | HEART RATE: 75 BPM | TEMPERATURE: 98 F | RESPIRATION RATE: 18 BRPM

## 2023-05-11 DIAGNOSIS — R07.9 CHEST PAIN: ICD-10-CM

## 2023-05-11 DIAGNOSIS — F41.9 ANXIETY: Primary | ICD-10-CM

## 2023-05-11 DIAGNOSIS — R07.89 ATYPICAL CHEST PAIN: ICD-10-CM

## 2023-05-11 PROCEDURE — 93010 EKG 12-LEAD: ICD-10-PCS | Mod: ,,, | Performed by: INTERNAL MEDICINE

## 2023-05-11 PROCEDURE — 93010 ELECTROCARDIOGRAM REPORT: CPT | Mod: ,,, | Performed by: INTERNAL MEDICINE

## 2023-05-11 PROCEDURE — 99283 EMERGENCY DEPT VISIT LOW MDM: CPT

## 2023-05-11 PROCEDURE — 93005 ELECTROCARDIOGRAM TRACING: CPT

## 2023-05-11 NOTE — ED PROVIDER NOTES
Encounter Date: 2023       History     Chief Complaint   Patient presents with    Chest Pain     Pt c/o cp and sob starting this am. Pt states she has a history of anxiety but it doesn't feel like that this am.     Patient presents the emergency department she says she is been having anxiety and shortness of breath for 1-2 years but it seems worse today she said she was laying down.  And she started feeling her anxiety coming up.  She started feeling lightheaded even while she was sleeping.  She said then she had some chest pain at about 4:00 a.m. she did not take any of her Vistaril that she has prescribed for this.  She comes emergency department around 6.  She denies doing any kind of stimulant type drugs such as cocaine or crystal meth.  She denies doing any the amphetamine drugs.  She said she does not do alcohol nor drugs she does smoke some though not much.      Said she is been vaccinated against flu tetanus and COVID.  No pneumonia shots.  She says Dr. Miller is her family doctor.  She says her last cycle was 2 months ago but they have not been normal since she got off the Depo shots in 2019.  She is had 1 C section 1 delivery of 1 child.  No other pregnancies.  She is not on any birth control right now.  She does have a history of anxiety and depression.  Currently not working she said.  Lives home with mom dad and her child.  Dad is alive he currently has pneumonia she says mom is alive she has diabetes.      Patient says she has no known drug allergies.  No recent weight loss or weight gain no swelling of her legs no edema to either leg.  No pleuritic nature to any chest pain.    Review of patient's allergies indicates:  No Known Allergies  Past Medical History:   Diagnosis Date    Anxiety disorder, unspecified     Depression     Insomnia      Past Surgical History:   Procedure Laterality Date     SECTION       Family History   Problem Relation Age of Onset    Hypertension Mother      Hypertension Father     Diabetes Father      Social History     Tobacco Use    Smoking status: Former     Packs/day: 0.50     Types: Cigarettes    Smokeless tobacco: Never   Substance Use Topics    Alcohol use: Not Currently    Drug use: Yes     Frequency: 2.0 times per week     Types: Marijuana     Review of Systems   Constitutional:  Negative for fever.   HENT:  Negative for congestion and sore throat.    Eyes: Negative.    Respiratory:  Negative for chest tightness and shortness of breath.    Cardiovascular:  Positive for chest pain. Negative for palpitations and leg swelling.   Gastrointestinal:  Negative for nausea.   Endocrine: Negative.    Genitourinary:  Negative for dysuria.   Musculoskeletal:  Negative for back pain.   Skin:  Negative for rash.   Allergic/Immunologic: Negative.    Neurological:  Positive for light-headedness. Negative for weakness.   Hematological:  Does not bruise/bleed easily.   Psychiatric/Behavioral:  The patient is nervous/anxious.    All other systems reviewed and are negative.    Physical Exam     Initial Vitals [05/11/23 0602]   BP Pulse Resp Temp SpO2   135/88 74 20 97.6 °F (36.4 °C) 100 %      MAP       --         Physical Exam    Nursing note and vitals reviewed.  Constitutional: She appears well-developed and well-nourished.   Pleasant lady wide awake talking GCS 15 no distress   HENT:   Head: Normocephalic and atraumatic.   Right Ear: Tympanic membrane and external ear normal.   Left Ear: Tympanic membrane and external ear normal.   Nose: Nose normal.   Mouth/Throat: Oropharynx is clear and moist and mucous membranes are normal.   Eyes: EOM are normal. Pupils are equal, round, and reactive to light.   Neck: Neck supple. No thyromegaly present. No tracheal deviation present. No JVD present.   Normal range of motion.  Cardiovascular:  Normal rate, regular rhythm and normal heart sounds.     Exam reveals no gallop and no friction rub.       No murmur heard.  Pulmonary/Chest:  Breath sounds normal. No stridor. No respiratory distress. She has no wheezes. She has no rhonchi. She has no rales. She exhibits no tenderness.   Abdominal: Abdomen is soft. Bowel sounds are normal. She exhibits no distension and no mass. There is no abdominal tenderness.   Genitourinary:    Genitourinary Comments: No CVA tenderness no chest wall tenderness     Musculoskeletal:         General: No tenderness or edema. Normal range of motion.      Cervical back: Normal range of motion and neck supple.     Lymphadenopathy:     She has no cervical adenopathy.   Neurological: She is alert and oriented to person, place, and time. She has normal strength and normal reflexes. No cranial nerve deficit. GCS score is 15. GCS eye subscore is 4. GCS verbal subscore is 5. GCS motor subscore is 6.   Skin: Skin is warm and dry. Capillary refill takes 2 to 3 seconds. No rash noted.   Psychiatric: She has a normal mood and affect. Her behavior is normal. Judgment and thought content normal.   She is perhaps slightly anxious at this time but certainly no panic event       ED Course   Procedures  Labs Reviewed - No data to display  EKG Readings: (Independently Interpreted)   Initial Reading: No STEMI. Previous EKG: Compared with most recent EKG Rhythm: Normal Sinus Rhythm. Heart Rate: 77. Ectopy: No Ectopy. Conduction: Normal. ST Segments: Normal ST Segments. T Waves: Normal. Clinical Impression: Normal Sinus Rhythm   It is a normal sinus rhythm with a sinus arrhythmia which is a normal EKG for young person she is 77 beats per minute.  0609 is the time.     Imaging Results    None          Medications - No data to display  Medical Decision Making:   Initial Assessment:   History of panic attacks history of anxiety comes in this morning saying this is not feel like that to her.  Said she did not take any of her medications for pleasant lady normocephalic atraumatic heart is regular rate and rhythm lungs are clear on the cardiac  monitor she is in a normal sinus rhythm without ectopy.  Her abdomen is quite benign EKG is normal.  She is not in any panic event right now.  She is slightly anxious  Differential Diagnosis:   Chest pain, anxiety, pulmonary embolus, myocardial infarction, pleurisy, pleurodynia, hyperventilation syndrome  ED Management:  EKGs performed it is normal.  Tried to reassure the patient told her take her medications and follow up with her doctor.  MDM  Problems addressed  Co-morbidities and/or factors adding to the complexity or risk for the patient:  None known  Problems addressed:  Anxiety/chest pain  Acute problem/illness or progression/exacerbation of chronic problem with potential threat to life/bodily dysfunction?:  None known  Differential diagnoses/problems considered: see above     Amount and/or Complexity of Data Reviewed  Independent Historian: none (see above for summary)  External Data Reviewed: notes from clinic visits (see above for summary)  Risk and benefits of testing: discussed   Labs: Labs: ordered and reviewed  Radiology:Radiology: ordered and independent interpretation performed (see above or ED course)  ECG/medicine tests:Radiology: ordered and independent interpretation performed (see above or ED course)  none    Risk  Prescription drug management   Diagnosis or treatment significantly impacted by social determinants of health: psychiatric illness  Shared decision making     Critical Care  none                         Clinical Impression:   Final diagnoses:  [F41.9] Anxiety (Primary)  [R07.89] Atypical chest pain        ED Disposition Condition    Discharge Stable          ED Prescriptions    None       Follow-up Information    None          Abdias Joya MD  05/11/23 5609

## 2023-06-07 ENCOUNTER — HOSPITAL ENCOUNTER (EMERGENCY)
Facility: HOSPITAL | Age: 23
Discharge: HOME | End: 2023-06-07
Attending: EMERGENCY MEDICINE
Payer: MEDICAID

## 2023-06-07 VITALS
DIASTOLIC BLOOD PRESSURE: 84 MMHG | RESPIRATION RATE: 18 BRPM | SYSTOLIC BLOOD PRESSURE: 124 MMHG | HEIGHT: 66 IN | WEIGHT: 170.19 LBS | TEMPERATURE: 98 F | OXYGEN SATURATION: 100 % | BODY MASS INDEX: 27.35 KG/M2 | HEART RATE: 81 BPM

## 2023-06-07 DIAGNOSIS — H53.9 VISUAL DISTURBANCE: Primary | ICD-10-CM

## 2023-06-07 PROCEDURE — 99282 EMERGENCY DEPT VISIT SF MDM: CPT

## 2023-06-07 NOTE — ED PROVIDER NOTES
Encounter Date: 2023       History     Chief Complaint   Patient presents with    Eye Problem     Pt states that she was here earlier tonight with her mother who had eye swelling and was diagnosed with shingles, states that about 10-15 minutes ago she woke up and her left eye felt irritated and her vision was slightly blurry in that eye. No noted drainage or irritation of the eye.     The history is provided by the patient. No  was used.   Eye Problem  This is a new problem. The current episode started less than 1 hour ago. The problem occurs constantly. Pertinent negatives include no chest pain and no shortness of breath. Nothing aggravates the symptoms. Nothing relieves the symptoms.   Woke with blurred vision and foreign body sensation.  Notes recent URI symptoms.    Review of patient's allergies indicates:  No Known Allergies  Past Medical History:   Diagnosis Date    Anxiety disorder, unspecified     Depression     Insomnia      Past Surgical History:   Procedure Laterality Date     SECTION       Family History   Problem Relation Age of Onset    Hypertension Mother     Hypertension Father     Diabetes Father      Social History     Tobacco Use    Smoking status: Former     Packs/day: 0.50     Types: Cigarettes    Smokeless tobacco: Never   Substance Use Topics    Alcohol use: Not Currently    Drug use: Yes     Frequency: 2.0 times per week     Types: Marijuana     Review of Systems   Constitutional:  Negative for fever.   HENT:  Negative for sore throat.    Respiratory:  Negative for shortness of breath.    Cardiovascular:  Negative for chest pain.   Gastrointestinal:  Negative for nausea.   Genitourinary:  Negative for dysuria.   Musculoskeletal:  Negative for back pain.   Skin:  Negative for rash.   Neurological:  Negative for weakness.   Hematological:  Does not bruise/bleed easily.     Physical Exam     Initial Vitals [23 0456]   BP Pulse Resp Temp SpO2   124/84 81 18  98 °F (36.7 °C) 100 %      MAP       --         Physical Exam    Nursing note and vitals reviewed.  Constitutional: She appears well-developed and well-nourished.   HENT:   Head: Normocephalic and atraumatic.   Right Ear: External ear normal.   Left Ear: External ear normal.   Eyes: Conjunctivae and EOM are normal. Pupils are equal, round, and reactive to light.   Normal appearing eyes;     Visual acuity:    20/10 - OD  20/20 - OS  20/13 - OU   Neck: Neck supple.   Normal range of motion.  Cardiovascular:  Normal rate, regular rhythm, normal heart sounds and intact distal pulses.           Pulmonary/Chest: Breath sounds normal.   Abdominal: Abdomen is soft. Bowel sounds are normal.   Musculoskeletal:         General: Normal range of motion.      Cervical back: Normal range of motion and neck supple.     Neurological: She is alert and oriented to person, place, and time. GCS score is 15. GCS eye subscore is 4. GCS verbal subscore is 5. GCS motor subscore is 6.   Skin: Skin is warm and dry. Capillary refill takes less than 2 seconds.   Psychiatric: She has a normal mood and affect. Her behavior is normal. Judgment and thought content normal.       ED Course   Procedures  Labs Reviewed - No data to display       Imaging Results    None          Medications - No data to display                           Clinical Impression:   Final diagnoses:  [H53.9] Visual disturbance (Primary)        ED Disposition Condition    Discharge Stable          ED Prescriptions       Medication Sig Dispense Start Date End Date Auth. Provider    naphazoline-pheniramine 0.025-0.3% (NAPHCON-A) 0.025-0.3 % ophthalmic solution Place 1 drop into the left eye 4 (four) times daily. for 5 days 5 mL 6/7/2023 6/12/2023 Trent Moe MD          Follow-up Information       Follow up With Specialties Details Why Contact Info    Follow up with your primary MD in 3-5 days if not improved.  Return to ED for worsening symptoms.                  Trent Moe MD  06/07/23 0523

## 2023-07-11 ENCOUNTER — LAB VISIT (OUTPATIENT)
Dept: LAB | Facility: HOSPITAL | Age: 23
End: 2023-07-11
Attending: FAMILY MEDICINE
Payer: MEDICAID

## 2023-07-11 DIAGNOSIS — E11.9 DIABETES MELLITUS WITHOUT COMPLICATION: Primary | ICD-10-CM

## 2023-07-11 LAB
ALBUMIN SERPL-MCNC: 3.9 G/DL (ref 3.5–5)
ALBUMIN/GLOB SERPL: 1 RATIO (ref 1.1–2)
ALP SERPL-CCNC: 74 UNIT/L (ref 40–150)
ALT SERPL-CCNC: 16 UNIT/L (ref 0–55)
APPEARANCE UR: CLEAR
AST SERPL-CCNC: 18 UNIT/L (ref 5–34)
BACTERIA #/AREA URNS AUTO: ABNORMAL /HPF
BASOPHILS # BLD AUTO: 0.03 X10(3)/MCL
BASOPHILS NFR BLD AUTO: 0.5 %
BILIRUB UR QL STRIP.AUTO: NEGATIVE MG/DL
BILIRUBIN DIRECT+TOT PNL SERPL-MCNC: 0.5 MG/DL
BUN SERPL-MCNC: 12 MG/DL (ref 7–18.7)
CALCIUM SERPL-MCNC: 9.6 MG/DL (ref 8.4–10.2)
CHLORIDE SERPL-SCNC: 108 MMOL/L (ref 98–107)
CHOLEST SERPL-MCNC: 179 MG/DL
CHOLEST/HDLC SERPL: 4 {RATIO} (ref 0–5)
CO2 SERPL-SCNC: 26 MMOL/L (ref 22–29)
COLOR UR: YELLOW
CREAT SERPL-MCNC: 0.78 MG/DL (ref 0.55–1.02)
DEPRECATED CALCIDIOL+CALCIFEROL SERPL-MC: 34 NG/ML (ref 30–80)
EOSINOPHIL # BLD AUTO: 0.23 X10(3)/MCL (ref 0–0.9)
EOSINOPHIL NFR BLD AUTO: 3.9 %
ERYTHROCYTE [DISTWIDTH] IN BLOOD BY AUTOMATED COUNT: 13.6 % (ref 11.5–17)
EST. AVERAGE GLUCOSE BLD GHB EST-MCNC: 96.8 MG/DL
GFR SERPLBLD CREATININE-BSD FMLA CKD-EPI: >60 MLS/MIN/1.73/M2
GLOBULIN SER-MCNC: 3.9 GM/DL (ref 2.4–3.5)
GLUCOSE SERPL-MCNC: 92 MG/DL (ref 74–100)
GLUCOSE UR QL STRIP.AUTO: NEGATIVE MG/DL
HBA1C MFR BLD: 5 %
HCT VFR BLD AUTO: 37.2 % (ref 37–47)
HDLC SERPL-MCNC: 51 MG/DL (ref 35–60)
HGB BLD-MCNC: 11.9 G/DL (ref 12–16)
IMM GRANULOCYTES # BLD AUTO: 0.01 X10(3)/MCL (ref 0–0.04)
IMM GRANULOCYTES NFR BLD AUTO: 0.2 %
KETONES UR QL STRIP.AUTO: ABNORMAL MG/DL
LDLC SERPL CALC-MCNC: 110 MG/DL (ref 50–140)
LEUKOCYTE ESTERASE UR QL STRIP.AUTO: NEGATIVE UNIT/L
LYMPHOCYTES # BLD AUTO: 3.38 X10(3)/MCL (ref 0.6–4.6)
LYMPHOCYTES NFR BLD AUTO: 58 %
MCH RBC QN AUTO: 27.8 PG (ref 27–31)
MCHC RBC AUTO-ENTMCNC: 32 G/DL (ref 33–36)
MCV RBC AUTO: 86.9 FL (ref 80–94)
MONOCYTES # BLD AUTO: 0.43 X10(3)/MCL (ref 0.1–1.3)
MONOCYTES NFR BLD AUTO: 7.4 %
NEUTROPHILS # BLD AUTO: 1.75 X10(3)/MCL (ref 2.1–9.2)
NEUTROPHILS NFR BLD AUTO: 30 %
NITRITE UR QL STRIP.AUTO: NEGATIVE
PH UR STRIP.AUTO: 5.5 [PH]
PLATELET # BLD AUTO: 261 X10(3)/MCL (ref 130–400)
PMV BLD AUTO: 11.7 FL (ref 7.4–10.4)
POTASSIUM SERPL-SCNC: 4.2 MMOL/L (ref 3.5–5.1)
PROT SERPL-MCNC: 7.8 GM/DL (ref 6.4–8.3)
PROT UR QL STRIP.AUTO: ABNORMAL MG/DL
RBC # BLD AUTO: 4.28 X10(6)/MCL (ref 4.2–5.4)
RBC #/AREA URNS AUTO: ABNORMAL /HPF
RBC UR QL AUTO: ABNORMAL UNIT/L
SODIUM SERPL-SCNC: 139 MMOL/L (ref 136–145)
SP GR UR STRIP.AUTO: 1.02
SQUAMOUS #/AREA URNS AUTO: ABNORMAL /HPF
TRIGL SERPL-MCNC: 89 MG/DL (ref 37–140)
TSH SERPL-ACNC: 2.58 UIU/ML (ref 0.35–4.94)
UROBILINOGEN UR STRIP-ACNC: 0.2 MG/DL
VLDLC SERPL CALC-MCNC: 18 MG/DL
WBC # SPEC AUTO: 5.83 X10(3)/MCL (ref 4.5–11.5)
WBC #/AREA URNS AUTO: ABNORMAL /HPF

## 2023-07-11 PROCEDURE — 84443 ASSAY THYROID STIM HORMONE: CPT

## 2023-07-11 PROCEDURE — 36415 COLL VENOUS BLD VENIPUNCTURE: CPT

## 2023-07-11 PROCEDURE — 83036 HEMOGLOBIN GLYCOSYLATED A1C: CPT

## 2023-07-11 PROCEDURE — 80053 COMPREHEN METABOLIC PANEL: CPT

## 2023-07-11 PROCEDURE — 82306 VITAMIN D 25 HYDROXY: CPT

## 2023-07-11 PROCEDURE — 85025 COMPLETE CBC W/AUTO DIFF WBC: CPT

## 2023-07-11 PROCEDURE — 80061 LIPID PANEL: CPT

## 2023-07-11 PROCEDURE — 81001 URINALYSIS AUTO W/SCOPE: CPT

## 2023-08-05 ENCOUNTER — HOSPITAL ENCOUNTER (EMERGENCY)
Facility: HOSPITAL | Age: 23
Discharge: HOME | End: 2023-08-05
Attending: INTERNAL MEDICINE
Payer: MEDICAID

## 2023-08-05 VITALS
BODY MASS INDEX: 28.32 KG/M2 | SYSTOLIC BLOOD PRESSURE: 142 MMHG | DIASTOLIC BLOOD PRESSURE: 91 MMHG | HEIGHT: 66 IN | OXYGEN SATURATION: 96 % | WEIGHT: 176.19 LBS | HEART RATE: 69 BPM | TEMPERATURE: 97 F | RESPIRATION RATE: 18 BRPM

## 2023-08-05 DIAGNOSIS — R10.84 GENERALIZED ABDOMINAL PAIN: ICD-10-CM

## 2023-08-05 DIAGNOSIS — K59.00 CONSTIPATION, UNSPECIFIED CONSTIPATION TYPE: Primary | ICD-10-CM

## 2023-08-05 LAB
AMPHET UR QL SCN: NEGATIVE
APPEARANCE UR: CLEAR
B-HCG SERPL QL: NEGATIVE
BACTERIA #/AREA URNS AUTO: ABNORMAL /HPF
BARBITURATE SCN PRESENT UR: NEGATIVE
BENZODIAZ UR QL SCN: NEGATIVE
BILIRUB UR QL STRIP.AUTO: NEGATIVE
CANNABINOIDS UR QL SCN: POSITIVE
COCAINE UR QL SCN: NEGATIVE
COLOR UR: YELLOW
FENTANYL UR QL SCN: NEGATIVE
GLUCOSE UR QL STRIP.AUTO: NEGATIVE
KETONES UR QL STRIP.AUTO: NEGATIVE
LEUKOCYTE ESTERASE UR QL STRIP.AUTO: NEGATIVE
MDMA UR QL SCN: NEGATIVE
NITRITE UR QL STRIP.AUTO: NEGATIVE
OPIATES UR QL SCN: NEGATIVE
PCP UR QL: NEGATIVE
PH UR STRIP.AUTO: 6 [PH]
PH UR: 6 [PH] (ref 3–11)
PROT UR QL STRIP.AUTO: NEGATIVE
RBC #/AREA URNS AUTO: ABNORMAL /HPF
RBC UR QL AUTO: ABNORMAL
SP GR UR STRIP.AUTO: >=1.03
SQUAMOUS #/AREA URNS AUTO: ABNORMAL /HPF
UROBILINOGEN UR STRIP-ACNC: 1
WBC #/AREA URNS AUTO: ABNORMAL /HPF

## 2023-08-05 PROCEDURE — 99284 EMERGENCY DEPT VISIT MOD MDM: CPT

## 2023-08-05 PROCEDURE — 81001 URINALYSIS AUTO W/SCOPE: CPT | Performed by: INTERNAL MEDICINE

## 2023-08-05 PROCEDURE — 80307 DRUG TEST PRSMV CHEM ANLYZR: CPT | Performed by: INTERNAL MEDICINE

## 2023-08-05 PROCEDURE — 25000003 PHARM REV CODE 250: Performed by: INTERNAL MEDICINE

## 2023-08-05 PROCEDURE — 81025 URINE PREGNANCY TEST: CPT | Performed by: INTERNAL MEDICINE

## 2023-08-05 RX ORDER — LACTULOSE 10 G/15ML
20 SOLUTION ORAL
Status: COMPLETED | OUTPATIENT
Start: 2023-08-05 | End: 2023-08-05

## 2023-08-05 RX ADMIN — LACTULOSE 20 G: 20 SOLUTION ORAL at 07:08

## 2023-08-05 NOTE — ED PROVIDER NOTES
"Encounter Date: 2023       History     Chief Complaint   Patient presents with    Abdominal Pain     Abdominal pain x4-5 days. Denies vomiting or diarrhea. States she feels like pain is "gas pain".     23-year-old black female with chronic constipation presents emergency department with proximally 5 days of generalized abdominal cramping.  Patient states she took Pepto-Bismol 2 days ago and it did not help.  She did not contact her primary care all week because the last time she had this and went to her primary care she was told she was anemic so she just waited till today and came into the emergency department.  She denies nausea vomiting fever or chills she tried to describe to me that her body gets hot and cold but she did not take a temperature.  She does have a history of being on birth control and she states she stopped it because it made her blood pressure go up.  She also reports a history of anxiety which she was prescribed Zoloft and a p.r.n. anxiety pill but she is not taken that in some time.  In regards to her abdomen she admits that she does not have bowel movements on a regular basis      Review of patient's allergies indicates:  No Known Allergies  Past Medical History:   Diagnosis Date    Anxiety disorder, unspecified     Depression     Insomnia      Past Surgical History:   Procedure Laterality Date     SECTION       Family History   Problem Relation Age of Onset    Hypertension Mother     Hypertension Father     Diabetes Father      Social History     Tobacco Use    Smoking status: Former     Current packs/day: 0.50     Types: Cigarettes    Smokeless tobacco: Never   Substance Use Topics    Alcohol use: Not Currently    Drug use: Not Currently     Frequency: 2.0 times per week     Types: Marijuana     Review of Systems   Constitutional: Negative.  Negative for activity change, appetite change, chills, diaphoresis, fatigue, fever and unexpected weight change.   HENT: Negative.  Negative " for congestion, dental problem, drooling, ear discharge, ear pain, facial swelling, hearing loss, mouth sores, nosebleeds, postnasal drip, rhinorrhea, sinus pressure, sinus pain, sneezing, sore throat, tinnitus, trouble swallowing and voice change.    Eyes: Negative.  Negative for photophobia, pain, discharge, redness, itching and visual disturbance.   Respiratory: Negative.  Negative for apnea, cough, choking, chest tightness, shortness of breath, wheezing and stridor.    Cardiovascular: Negative.  Negative for chest pain, palpitations and leg swelling.   Gastrointestinal:  Positive for abdominal pain and constipation. Negative for abdominal distention, anal bleeding, blood in stool, diarrhea, nausea, rectal pain and vomiting.   Endocrine: Negative.  Negative for cold intolerance, heat intolerance, polydipsia, polyphagia and polyuria.   Genitourinary: Negative.  Negative for decreased urine volume, difficulty urinating, dyspareunia, dysuria, enuresis, flank pain, frequency, genital sores, hematuria, menstrual problem, pelvic pain, urgency, vaginal bleeding, vaginal discharge and vaginal pain.   Musculoskeletal: Negative.  Negative for arthralgias, back pain, gait problem, joint swelling, myalgias, neck pain and neck stiffness.   Skin: Negative.  Negative for color change, pallor, rash and wound.   Allergic/Immunologic: Negative.  Negative for environmental allergies, food allergies and immunocompromised state.   Neurological: Negative.  Negative for dizziness, tremors, seizures, syncope, facial asymmetry, speech difficulty, weakness, light-headedness, numbness and headaches.   Hematological: Negative.  Negative for adenopathy. Does not bruise/bleed easily.   Psychiatric/Behavioral: Negative.  Negative for agitation, behavioral problems, confusion, decreased concentration, dysphoric mood, hallucinations, self-injury, sleep disturbance and suicidal ideas. The patient is not nervous/anxious and is not hyperactive.     All other systems reviewed and are negative.      Physical Exam     Initial Vitals [08/05/23 0649]   BP Pulse Resp Temp SpO2   (!) 142/91 69 18 97 °F (36.1 °C) 96 %      MAP       --         Physical Exam    Nursing note and vitals reviewed.  Constitutional: She appears well-developed and well-nourished. She is not diaphoretic. No distress.   HENT:   Head: Normocephalic and atraumatic.   Mouth/Throat: Oropharynx is clear and moist. No oropharyngeal exudate.   Eyes: Conjunctivae and EOM are normal. Pupils are equal, round, and reactive to light. No scleral icterus.   Neck: Neck supple. No JVD present.   Normal range of motion.  Cardiovascular:  Normal rate, regular rhythm, normal heart sounds and intact distal pulses.     Exam reveals no gallop and no friction rub.       No murmur heard.  Pulmonary/Chest: Breath sounds normal. No respiratory distress. She has no wheezes. She exhibits no tenderness.   Abdominal: Abdomen is soft. Bowel sounds are normal. She exhibits no distension. There is no abdominal tenderness. There is no rebound.   Musculoskeletal:         General: Normal range of motion.      Cervical back: Normal range of motion and neck supple.     Neurological: She is alert and oriented to person, place, and time. She has normal strength and normal reflexes.   Skin: Skin is warm and dry. Capillary refill takes less than 2 seconds.   Psychiatric: She has a normal mood and affect. Her behavior is normal. Judgment and thought content normal.         ED Course   Procedures  Labs Reviewed   URINALYSIS, REFLEX TO URINE CULTURE - Abnormal; Notable for the following components:       Result Value    Blood, UA 1+ (*)     All other components within normal limits   DRUG SCREEN, URINE (BEAKER) - Abnormal; Notable for the following components:    Cannabinoids, Urine Positive (*)     All other components within normal limits    Narrative:     Cut off concentrations:    Amphetamines - 1000 ng/ml  Barbiturates - 200  ng/ml  Benzodiazepine - 200 ng/ml  Cannabinoids (THC) - 50 ng/ml  Cocaine - 300 ng/ml  Fentanyl - 1.0 ng/ml  MDMA - 500 ng/ml  Opiates - 300 ng/ml   Phencyclidine (PCP) - 25 ng/ml    Specimen submitted for drug analysis and tested for pH and specific gravity in order to evaluate sample integrity. Suspect tampering if specific gravity is <1.003 and/or pH is not within the range of 4.5 - 8.0  False negatives may result form substances such as bleach added to urine.  False positives may result for the presence of a substance with similar chemical structure to the drug or its metabolite.    This test provides only a PRELIMINARY analytical test result. A more specific alternate chemical method must be used in order to obtain a confirmed analytical result. Gas chromatography/mass spectrometry (GC/MS) is the preferred confirmatory method. Other chemical confirmation methods are available. Clinical consideration and professional judgement should be applied to any drug of abuse test result, particularly when preliminary positive results are used.    Positive results will be confirmed only at the physicians request. Unconfirmed screening results are to be used only for medical purposes (treatment).        URINALYSIS, MICROSCOPIC - Abnormal; Notable for the following components:    Squamous Epithelial Cells, UA Few (*)     All other components within normal limits   PREGNANCY TEST, URINE RAPID - Normal          Imaging Results              X-Ray Abdomen Flat And Erect (Preliminary result)  Result time 08/05/23 07:38:04      Wet Read by Dago Sykes MD (08/05/23 07:38:04, [facility] - Emergency Dept, Emergency Medicine)    Nonspecific bowel gas pattern with no evidence of perforation or obstruction.  There is feces throughout the intestine consistent with patient's clinical picture of constipation and there or stool balls noted in the pelvic area but no evidence of an impaction                                      Medications   lactulose 20 gram/30 mL solution Soln 20 g (has no administration in time range)     Medical Decision Making:   Initial Assessment:   23-year-old black female with generalized abdominal pain  Differential Diagnosis:   Constipation, anxiety, diverticulitis, cholecystitis, appendicitis, dysmenorrhea, gastroenteritis, ovarian cyst  Clinical Tests:   Lab Tests: Ordered and Reviewed  Radiological Study: Reviewed and Ordered  ED Management:  Reviewed results of x-ray and urine with patient.  Will give lactulose x1 dose today recommend she drink plenty fluids and if she does not have a large bowel movement to start taking MiraLax until.  Will recommend that she take a daily stool softeners and fiber supplements to alleviate her chronic constipation issues and attempt to avoid recurrent visits to the emergency department for non emergent services                          Clinical Impression:   Final diagnoses:  [R10.84] Generalized abdominal pain  [K59.00] Constipation, unspecified constipation type (Primary)        ED Disposition Condition    Discharge Stable          ED Prescriptions    None       Follow-up Information       Follow up With Specialties Details Why Contact Info    Vvii Miller MD Family Medicine In 3 days  [address]  [place] [address]  [phone]            Argenis Pope is a certified MA and was present during the entire interaction with this patient      Dago Sykes MD  08/05/23 3622

## 2023-09-01 NOTE — PROGRESS NOTES
Chief Complaint:  Well woman     History of Present Illness:  Tony Galindo is a 23 y.o. year old  presents for her well woman. Currently using nothing for birth control, seeking pregnancy. Patient has monthly cycles with normal flow lasting 4-5 days. States her cycle length has been different ranging from 3-5 days. Reports long history of depo provera for a few years, discontinued in 2018. States while living in [place], she visited a doctor and underwent blood work (BHAKTI signed). Was taking OCPs for a short amount of time, last pill was July of this year. LMP 23.     Pt is a poor historian.      LMP: 23  Frequency: monthly   Cycle Length: 4-5 days   Flow: normal  Intermenstrual Bleeding: No  Postcoital Bleeding: No  Dysmenorrhea: Yes  Sexually Active: Yes   Dyspareunia: No  Contraception: none   H/o STI: CZ  Last pap: unknown  H/o abnl pap: No   Colposcopy: no  Gardasil: 3/3   MMG: never  H/o abnl MMG: none  Colonoscopy: none        Review of Systems:  General/Constitutional: Chills denies. Fatigue/weakness denies. Fever denies. Night sweats denies. Hot flashes denies    Respiratory: Cough denies. Hemoptysis denies. SOB denies. Sputum production denies. Wheezing denies .   Cardiovascular: Chest pain denies. Dizziness denies. Palpitations denies. Swelling in hands/feet denies    Gastrointestinal: Abdominal pain denies. Blood in stool denies. Constipation denies. Diarrhea denies. Heartburn denies. Nausea denies. Vomiting denies    Genitourinary: Incontinence denies. Blood in urine denies. Frequent urination denies. Painful urination denies. Urinary urgency denies. Nocturia denies    Gynecologic: Irregular menses denies. Heavy bleeding denies. Painful menses denies. Vaginal discharge denies. Vaginal odor denies. Vaginal itching denies. Vaginal lesion denies. Pelvic pain denies. Decreased libido denies. Vulvar lesion denies. Prolapse of genital organs denies. Painful intercourse denies. Postcoital  "bleeding denies    Psychiatric: Depression denies. Anxiety denies     OB History    Para Term  AB Living   1 1 1 0 0 1   SAB IAB Ectopic Multiple Live Births   0 0 0 0 1      # 1 - Date: 16, Sex: Male, Weight: None, GA: 39w0d, Delivery: , Low Transverse, Apgar1: None, Apgar5: None, Living: Living, Birth Comments: None      Past Medical History:   Diagnosis Date    Anxiety disorder, unspecified     Depression     Insomnia        Current Outpatient Medications:     hydrOXYzine pamoate (VISTARIL) 25 MG Cap, Take 1 capsule (25 mg total) by mouth every 6 (six) hours as needed (Anxiety). (Patient not taking: Reported on 2023), Disp: 20 capsule, Rfl: 0    ondansetron (ZOFRAN) 4 MG tablet, Take 1 tablet (4 mg total) by mouth every 6 (six) hours. (Patient not taking: Reported on 2023), Disp: 12 tablet, Rfl: 0    Review of patient's allergies indicates:  No Known Allergies    Past Surgical History:   Procedure Laterality Date     SECTION  2016    Dr. Limon     Family History   Problem Relation Age of Onset    Hypertension Father     Diabetes Father     Hypertension Mother     Breast cancer Neg Hx     Cervical cancer Neg Hx     Colon cancer Neg Hx     Ovarian cancer Neg Hx     Uterine cancer Neg Hx      Social History     Socioeconomic History    Marital status: Single   Tobacco Use    Smoking status: Former     Current packs/day: 0.50     Types: Cigarettes    Smokeless tobacco: Never   Substance and Sexual Activity    Alcohol use: Not Currently    Drug use: Not Currently     Frequency: 2.0 times per week     Types: Marijuana    Sexual activity: Yes     Partners: Male     Birth control/protection: None       Physical Exam:  /74 (BP Location: Left arm, Patient Position: Sitting)   Ht 5' 6" (1.676 m)   Wt 83.7 kg (184 lb 9.6 oz)   LMP 2023 (Exact Date)   BMI 29.80 kg/m²     Chaperone: present.     General appearance: healthy, well-nourished and well-developed "     Psychiatric: Orientation to time, place and person. Normal mood and affect and active, alert     Skin: Appearance: no rashes or lesions.     Neck:   Neck: supple, FROM, trachea midline. and no masses   Thyroid: no enlargement or nodules and non-tender.       Cardiovascular:   Auscultation: RRR and no murmur.   Peripheral Vascular: no varicosities, LLE edema, RLE edema, calf tenderness, and palpable cord and pedal pulses intact.     Lungs:   Respiratory effort: no intercostal retractions or accessory muscle usage.   Auscultation: no wheezing, rales/crackles, or rhonchi and clear to auscultation.     Breast:   Inspection/Palpation: no tenderness, discrete/distinct masses, skin changes, or abnormal secretions. Nipple appearance normal.     Abdomen:   Auscultation/Inspection/Palpation: no hepatomegaly, splenomegaly, masses, tenderness or CVA tenderness and soft, non-distended bowel sounds preset.    Hernia: no palpable hernias.     Female Genitalia:    Vulva: no masses, tenderness or lesions    Bladder/Urethra: no urethral discharge or mass, normal meatus, bladder non-distended.    Vagina: no tenderness, erythema, cystocele, rectocele, abnormal vaginal discharge or vesicle(s) or ulcers    Cervix: no discharge, no cervical lacerations noted or motion tenderness and grossly normal    Uterus: normal size and shape and midline, non-tender, and no uterine prolapse.    Adnexa/Parametria: no parametrial tenderness or mass, no adnexal tenderness or ovarian mass.     Lymph Nodes:   Palpation: non tender submandibular nodes, axillary nodes, or inguinal nodes.     Rectal Exam:   Rectum: normal perianal skin.       Assessment/Plan:  1. Well woman exam  Pap  Advised patient if she notices any changes to her breasts, including a lump, mass, dimpling, discharge, rash or tenderness, to should contact us immediately   Healthy diet, exercise   Multivitamin   Seat belt   Sunscreen use   Safe sex/ STI education   Contraception  evaluation: declines   Gardasil evaluation: 3/3    BHAKTI signed for records in [facility]      2. Patient desires pregnancy  Educated  PNV, folic acid  Timed intercourse  Ovulation kits  No FRANCK  Call office with positive UPT

## 2023-09-07 ENCOUNTER — OFFICE VISIT (OUTPATIENT)
Dept: OBSTETRICS AND GYNECOLOGY | Facility: CLINIC | Age: 23
End: 2023-09-07
Payer: MEDICAID

## 2023-09-07 VITALS
HEIGHT: 66 IN | BODY MASS INDEX: 29.67 KG/M2 | DIASTOLIC BLOOD PRESSURE: 74 MMHG | WEIGHT: 184.63 LBS | SYSTOLIC BLOOD PRESSURE: 122 MMHG

## 2023-09-07 DIAGNOSIS — Z31.9 PATIENT DESIRES PREGNANCY: ICD-10-CM

## 2023-09-07 DIAGNOSIS — Z01.419 WELL WOMAN EXAM: ICD-10-CM

## 2023-09-07 DIAGNOSIS — Z30.9 ENCOUNTER FOR CONTRACEPTIVE MANAGEMENT, UNSPECIFIED TYPE: ICD-10-CM

## 2023-09-07 PROCEDURE — 3008F BODY MASS INDEX DOCD: CPT | Mod: CPTII,,, | Performed by: OBSTETRICS & GYNECOLOGY

## 2023-09-07 PROCEDURE — 3074F PR MOST RECENT SYSTOLIC BLOOD PRESSURE < 130 MM HG: ICD-10-PCS | Mod: CPTII,,, | Performed by: OBSTETRICS & GYNECOLOGY

## 2023-09-07 PROCEDURE — 1159F MED LIST DOCD IN RCRD: CPT | Mod: CPTII,,, | Performed by: OBSTETRICS & GYNECOLOGY

## 2023-09-07 PROCEDURE — 3078F DIAST BP <80 MM HG: CPT | Mod: CPTII,,, | Performed by: OBSTETRICS & GYNECOLOGY

## 2023-09-07 PROCEDURE — 87661 TRICHOMONAS VAGINALIS AMPLIF: CPT

## 2023-09-07 PROCEDURE — 88174 CYTOPATH C/V AUTO IN FLUID: CPT | Performed by: OBSTETRICS & GYNECOLOGY

## 2023-09-07 PROCEDURE — 3074F SYST BP LT 130 MM HG: CPT | Mod: CPTII,,, | Performed by: OBSTETRICS & GYNECOLOGY

## 2023-09-07 PROCEDURE — 3044F HG A1C LEVEL LT 7.0%: CPT | Mod: CPTII,,, | Performed by: OBSTETRICS & GYNECOLOGY

## 2023-09-07 PROCEDURE — 3044F PR MOST RECENT HEMOGLOBIN A1C LEVEL <7.0%: ICD-10-PCS | Mod: CPTII,,, | Performed by: OBSTETRICS & GYNECOLOGY

## 2023-09-07 PROCEDURE — 99385 PR PREVENTIVE VISIT,NEW,18-39: ICD-10-PCS | Mod: ,,, | Performed by: OBSTETRICS & GYNECOLOGY

## 2023-09-07 PROCEDURE — 3078F PR MOST RECENT DIASTOLIC BLOOD PRESSURE < 80 MM HG: ICD-10-PCS | Mod: CPTII,,, | Performed by: OBSTETRICS & GYNECOLOGY

## 2023-09-07 PROCEDURE — 87491 CHLMYD TRACH DNA AMP PROBE: CPT

## 2023-09-07 PROCEDURE — 87591 N.GONORRHOEAE DNA AMP PROB: CPT

## 2023-09-07 PROCEDURE — 99385 PREV VISIT NEW AGE 18-39: CPT | Mod: ,,, | Performed by: OBSTETRICS & GYNECOLOGY

## 2023-09-07 PROCEDURE — 3008F PR BODY MASS INDEX (BMI) DOCUMENTED: ICD-10-PCS | Mod: CPTII,,, | Performed by: OBSTETRICS & GYNECOLOGY

## 2023-09-07 PROCEDURE — 1159F PR MEDICATION LIST DOCUMENTED IN MEDICAL RECORD: ICD-10-PCS | Mod: CPTII,,, | Performed by: OBSTETRICS & GYNECOLOGY

## 2023-09-11 LAB — PSYCHE PATHOLOGY RESULT: NORMAL

## 2023-09-21 ENCOUNTER — OFFICE VISIT (OUTPATIENT)
Dept: OBSTETRICS AND GYNECOLOGY | Facility: CLINIC | Age: 23
End: 2023-09-21
Payer: MEDICAID

## 2023-09-21 VITALS
HEIGHT: 66 IN | BODY MASS INDEX: 30.24 KG/M2 | DIASTOLIC BLOOD PRESSURE: 70 MMHG | WEIGHT: 188.19 LBS | TEMPERATURE: 97 F | SYSTOLIC BLOOD PRESSURE: 122 MMHG

## 2023-09-21 DIAGNOSIS — Z31.9 DESIRE FOR PREGNANCY: ICD-10-CM

## 2023-09-21 DIAGNOSIS — F41.9 ANXIETY: ICD-10-CM

## 2023-09-21 DIAGNOSIS — N91.5 OLIGOMENORRHEA, UNSPECIFIED TYPE: Primary | ICD-10-CM

## 2023-09-21 LAB
B-HCG UR QL: NEGATIVE
CTP QC/QA: YES

## 2023-09-21 PROCEDURE — 3078F PR MOST RECENT DIASTOLIC BLOOD PRESSURE < 80 MM HG: ICD-10-PCS | Mod: CPTII,,, | Performed by: OBSTETRICS & GYNECOLOGY

## 2023-09-21 PROCEDURE — 3044F PR MOST RECENT HEMOGLOBIN A1C LEVEL <7.0%: ICD-10-PCS | Mod: CPTII,,, | Performed by: OBSTETRICS & GYNECOLOGY

## 2023-09-21 PROCEDURE — 3044F HG A1C LEVEL LT 7.0%: CPT | Mod: CPTII,,, | Performed by: OBSTETRICS & GYNECOLOGY

## 2023-09-21 PROCEDURE — 81025 URINE PREGNANCY TEST: CPT | Mod: ,,, | Performed by: OBSTETRICS & GYNECOLOGY

## 2023-09-21 PROCEDURE — 99213 PR OFFICE/OUTPT VISIT, EST, LEVL III, 20-29 MIN: ICD-10-PCS | Mod: ,,, | Performed by: OBSTETRICS & GYNECOLOGY

## 2023-09-21 PROCEDURE — 3074F SYST BP LT 130 MM HG: CPT | Mod: CPTII,,, | Performed by: OBSTETRICS & GYNECOLOGY

## 2023-09-21 PROCEDURE — 81025 POCT URINE PREGNANCY: ICD-10-PCS | Mod: ,,, | Performed by: OBSTETRICS & GYNECOLOGY

## 2023-09-21 PROCEDURE — 1159F PR MEDICATION LIST DOCUMENTED IN MEDICAL RECORD: ICD-10-PCS | Mod: CPTII,,, | Performed by: OBSTETRICS & GYNECOLOGY

## 2023-09-21 PROCEDURE — 99213 OFFICE O/P EST LOW 20 MIN: CPT | Mod: ,,, | Performed by: OBSTETRICS & GYNECOLOGY

## 2023-09-21 PROCEDURE — 3008F BODY MASS INDEX DOCD: CPT | Mod: CPTII,,, | Performed by: OBSTETRICS & GYNECOLOGY

## 2023-09-21 PROCEDURE — 3078F DIAST BP <80 MM HG: CPT | Mod: CPTII,,, | Performed by: OBSTETRICS & GYNECOLOGY

## 2023-09-21 PROCEDURE — 1159F MED LIST DOCD IN RCRD: CPT | Mod: CPTII,,, | Performed by: OBSTETRICS & GYNECOLOGY

## 2023-09-21 PROCEDURE — 3008F PR BODY MASS INDEX (BMI) DOCUMENTED: ICD-10-PCS | Mod: CPTII,,, | Performed by: OBSTETRICS & GYNECOLOGY

## 2023-09-21 PROCEDURE — 3074F PR MOST RECENT SYSTOLIC BLOOD PRESSURE < 130 MM HG: ICD-10-PCS | Mod: CPTII,,, | Performed by: OBSTETRICS & GYNECOLOGY

## 2023-09-21 RX ORDER — FERROUS SULFATE TAB 325 MG (65 MG ELEMENTAL FE) 325 (65 FE) MG
TAB ORAL 2 TIMES DAILY
COMMUNITY
Start: 2023-07-27

## 2023-09-21 NOTE — PROGRESS NOTES
Chief Complaint:  Follow-up    History of Present Illness:  Patient is a 23 y.o.  presents c/o missed cycle. LMP 23. Prior to this, has monthly cycles with normal flow lasting 4-5 days.     NOTE POOR HISTORIAN    LMP: 23  Frequency: irregular   Cycle Length: 4-5 days   Flow: normal-heavy   Intermenstrual Bleeding: No  Postcoital Bleeding: No  Dysmenorrhea: Yes  Sexually Active: Yes   Dyspareunia: No  Contraception: none   H/o STI: CZ  Last pap: 2023 WNL; -GC/CZ/TV  H/o abnl pap: No   Colposcopy: no  Gardasil: 3/3   MMG: never  H/o abnl MMG: none  Colonoscopy: none        Review of systems:  General/Constitutional: Chills denies. Fatigue/weakness denies. Fever denies. Night sweats denies. Hot flashes denies  Gastrointestinal: Abdominal pain denies. Blood in stool denies. Constipation denies. Diarrhea denies. Heartburn denies. Nausea denies. Vomiting denies   Genitourinary: Incontinence denies. Blood in urine denies. Frequent urination denies. Urgency denies. Painful urination denies. Nocturia denies   Gynecologic: Irregular menses denies. Heavy bleeding denies. Painful menses denies. Vaginal discharge denies. Vaginal odor denies. Vaginal itching/Irritation denies. Vaginal lesion denies.  Pelvic pain denies. Decreased libido denies. Vulvar lesion denies. Prolapse of genital organs denies. Painful intercourse denies. Postcoital bleeding denies   Psychiatric: Mood lability denies. Depressed mood denies. Suicidal thoughts denies. Anxiety denies. Overwhelmed denies. Appetite normal. Energy level normal     OB History    Para Term  AB Living   1 1 1 0 0 1   SAB IAB Ectopic Multiple Live Births   0 0 0 0 1      # 1 - Date: 16, Sex: Male, Weight: None, GA: 39w0d, Delivery: , Low Transverse, Apgar1: None, Apgar5: None, Living: Living, Birth Comments: None      Past Medical History:   Diagnosis Date    Anxiety disorder, unspecified     Depression     Insomnia        Current  "Outpatient Medications:     FEROSUL 325 mg (65 mg iron) Tab tablet, Take by mouth 2 (two) times daily., Disp: , Rfl:     hydrOXYzine pamoate (VISTARIL) 25 MG Cap, Take 1 capsule (25 mg total) by mouth every 6 (six) hours as needed (Anxiety). (Patient not taking: Reported on 9/7/2023), Disp: 20 capsule, Rfl: 0    ondansetron (ZOFRAN) 4 MG tablet, Take 1 tablet (4 mg total) by mouth every 6 (six) hours. (Patient not taking: Reported on 9/7/2023), Disp: 12 tablet, Rfl: 0      Physical Exam:  /70 (BP Location: Left arm, Patient Position: Sitting, BP Method: Medium (Manual))   Temp 97.2 °F (36.2 °C) (Temporal)   Ht 5' 6" (1.676 m)   Wt 85.4 kg (188 lb 3.2 oz)   LMP 08/05/2023 (Exact Date)   BMI 30.38 kg/m²     Constitutional: General appearance: healthy, well-nourished and well-developed   Psychiatric:  Orientation to time, place and person. Normal mood and affect and active, alert   Abdomen: Auscultation/Inspection/Palpation: No tenderness or masses. Soft, nondistended       Assessment/Plan:  1. Oligomenorrhea, unspecified type  Educated   UPT negative  Discussed with patient bleeding patterns following discontinuation of depo provera  Monitor cycles for 3 months  Call if continues to have irregular bleeding  Will await BHAKTI from [place]    2. BMI 30.0-30.9,adult  Weight loss with diet and exercise modifications    3. Anxiety  Educated     Counseling information given for [facility] [phone]     HI & SI precautions    4. Desire for pregnancy  Educated  PNV, folic acid  Timed intercourse  Ovulation kits  No FRANCK  Call office with positive UPT       "

## 2023-11-21 ENCOUNTER — HOSPITAL ENCOUNTER (EMERGENCY)
Facility: HOSPITAL | Age: 23
Discharge: HOME | End: 2023-11-21
Attending: FAMILY MEDICINE
Payer: MEDICAID

## 2023-11-21 VITALS
TEMPERATURE: 98 F | OXYGEN SATURATION: 100 % | SYSTOLIC BLOOD PRESSURE: 132 MMHG | RESPIRATION RATE: 18 BRPM | HEIGHT: 66 IN | WEIGHT: 194.81 LBS | DIASTOLIC BLOOD PRESSURE: 77 MMHG | BODY MASS INDEX: 31.31 KG/M2 | HEART RATE: 82 BPM

## 2023-11-21 DIAGNOSIS — J06.9 VIRAL URI WITH COUGH: Primary | ICD-10-CM

## 2023-11-21 LAB
FLUAV AG UPPER RESP QL IA.RAPID: NOT DETECTED
FLUBV AG UPPER RESP QL IA.RAPID: NOT DETECTED
SARS-COV-2 RNA RESP QL NAA+PROBE: NOT DETECTED

## 2023-11-21 PROCEDURE — 99283 EMERGENCY DEPT VISIT LOW MDM: CPT

## 2023-11-21 PROCEDURE — 0240U COVID/FLU A&B PCR: CPT | Performed by: FAMILY MEDICINE

## 2023-11-21 RX ORDER — FLUTICASONE PROPIONATE 50 MCG
2 SPRAY, SUSPENSION (ML) NASAL DAILY
Qty: 15 G | Refills: 0 | Status: SHIPPED | OUTPATIENT
Start: 2023-11-21

## 2023-11-21 RX ORDER — CETIRIZINE HYDROCHLORIDE 10 MG/1
10 TABLET ORAL DAILY
Qty: 14 TABLET | Refills: 0 | Status: SHIPPED | OUTPATIENT
Start: 2023-11-21 | End: 2023-12-05

## 2023-11-21 RX ORDER — BENZONATATE 100 MG/1
100 CAPSULE ORAL 3 TIMES DAILY PRN
Qty: 20 CAPSULE | Refills: 0 | Status: SHIPPED | OUTPATIENT
Start: 2023-11-21 | End: 2023-12-12

## 2023-11-21 NOTE — Clinical Note
"Tony"Omar Galindo was seen and treated in our emergency department on 11/21/2023.  She may return to work on 11/23/2023.       If you have any questions or concerns, please don't hesitate to call.      Louie Torres MD"

## 2023-11-22 NOTE — ED PROVIDER NOTES
Encounter Date: 2023       History     Chief Complaint   Patient presents with    Nasal Congestion     Pt reports headache, nasal congestion, and flu like symptoms x2 days. Pt reports taking nyquil last night no meds PTA.      Patient 23-year-old female presents emergency room complaints of postnasal drip, cough, body aches.  Patient had a leave work today, therefore came to the emergency room for evaluation and requesting a work excuse.  Patient reports other sick contacts at home.  Denies chest pain.  Denies nausea or vomiting.  Denies dysuria or hematuria.    The history is provided by the patient.     Review of patient's allergies indicates:  No Known Allergies  Past Medical History:   Diagnosis Date    Anxiety disorder, unspecified     Depression     Insomnia      Past Surgical History:   Procedure Laterality Date     SECTION  2016    Dr. Limon     Family History   Problem Relation Age of Onset    Hypertension Father     Diabetes Father     Hypertension Mother     Breast cancer Neg Hx     Cervical cancer Neg Hx     Colon cancer Neg Hx     Ovarian cancer Neg Hx     Uterine cancer Neg Hx      Social History     Tobacco Use    Smoking status: Former     Current packs/day: 0.00     Types: Cigarettes     Quit date: 2015     Years since quittin.9    Smokeless tobacco: Never   Substance Use Topics    Alcohol use: Not Currently    Drug use: Not Currently     Frequency: 2.0 times per week     Types: Marijuana     Review of Systems   Constitutional:  Positive for chills and fatigue. Negative for fever.   HENT:  Positive for rhinorrhea and sore throat. Negative for ear pain.    Eyes:  Negative for photophobia and pain.   Respiratory:  Positive for cough. Negative for shortness of breath and wheezing.    Cardiovascular:  Negative for chest pain.   Gastrointestinal:  Negative for abdominal pain, diarrhea, nausea and vomiting.   Genitourinary:  Negative for dysuria.   Musculoskeletal:  Positive  for myalgias.   Neurological:  Negative for dizziness, weakness and headaches.   All other systems reviewed and are negative.      Physical Exam     Initial Vitals [11/21/23 2210]   BP Pulse Resp Temp SpO2   132/77 82 18 98.1 °F (36.7 °C) 100 %      MAP       --         Physical Exam    Nursing note and vitals reviewed.  Constitutional: She appears well-developed and well-nourished. No distress.   HENT:   Head: Normocephalic and atraumatic.   Mouth/Throat: Oropharynx is clear and moist. No oropharyngeal exudate.   Eyes: Conjunctivae and EOM are normal. Pupils are equal, round, and reactive to light.   Neck: Neck supple.   Normal range of motion.  Cardiovascular:  Normal rate, regular rhythm, normal heart sounds and intact distal pulses.           Pulmonary/Chest: Breath sounds normal. No respiratory distress. She has no wheezes. She has no rhonchi. She has no rales.   Abdominal: Abdomen is soft. Bowel sounds are normal. She exhibits no distension. There is no abdominal tenderness. There is no rebound and no guarding.   Musculoskeletal:         General: Normal range of motion.      Cervical back: Normal range of motion and neck supple.     Neurological: She is alert and oriented to person, place, and time.   Skin: Skin is warm and dry. Capillary refill takes less than 2 seconds. No erythema.   Psychiatric: She has a normal mood and affect. Her behavior is normal. Judgment and thought content normal.         ED Course   Procedures  Labs Reviewed   COVID/FLU A&B PCR - Normal    Narrative:     The Xpert Xpress SARS-CoV-2/FLU/RSV plus is a rapid, multiplexed real-time PCR test intended for the simultaneous qualitative detection and differentiation of SARS-CoV-2, Influenza A, Influenza B, and respiratory syncytial virus (RSV) viral RNA in either nasopharyngeal swab or nasal swab specimens.                Imaging Results    None          Medications - No data to display  Medical Decision Making  23-year-old female presents  emergency room with viral upper respiratory type symptoms.  Will obtain COVID flu PCR.  Currently nontoxic appearing.  Will prescribe symptomatic medications.  Will continue to monitor    Differential diagnosis:  Upper respiratory infection, viral syndrome, influenza, COVID-19    Amount and/or Complexity of Data Reviewed  Labs:  Decision-making details documented in ED Course.    Risk  OTC drugs.  Prescription drug management.               ED Course as of 11/21/23 2314   Tue Nov 21, 2023   2311 Influenza A, Molecular: Not Detected [MW]   2311 Influenza B, Molecular: Not Detected [MW]   2311 SARS-CoV2 (COVID-19) Qualitative PCR: Not Detected [MW]   2312 No acute lab abnormalities appreciated; stable for discharge to home.  ER precautions for any acute worsening. [MW]      ED Course User Index  [MW] Louie Torres MD                        Clinical Impression:  Final diagnoses:  [J06.9] Viral URI with cough (Primary)          ED Disposition Condition    Discharge Stable          ED Prescriptions       Medication Sig Dispense Start Date End Date Auth. Provider    benzonatate (TESSALON) 100 MG capsule Take 1 capsule (100 mg total) by mouth 3 (three) times daily as needed for Cough. 20 capsule 11/21/2023 12/12/2023 Louie Torres MD    cetirizine (ZYRTEC) 10 MG tablet Take 1 tablet (10 mg total) by mouth once daily. for 14 days 14 tablet 11/21/2023 12/5/2023 Louie Torres MD    fluticasone propionate (FLONASE) 50 mcg/actuation nasal spray 2 sprays (100 mcg total) by Each Nostril route once daily. 15 g 11/21/2023 -- Louie Torres MD          Follow-up Information       Follow up With Specialties Details Why Contact Info    Vivi Miller MD Family Medicine   [address]  [place] [address]  [phone]      [facility] - Emergency Dept Emergency Medicine  As needed, If symptoms worsen [address] [facility] [facility] [address] [phone]              Louie Torres MD  11/21/23 5173

## 2024-02-16 ENCOUNTER — HOSPITAL ENCOUNTER (EMERGENCY)
Facility: HOSPITAL | Age: 24
Discharge: HOME | End: 2024-02-16
Attending: INTERNAL MEDICINE
Payer: MEDICAID

## 2024-02-16 VITALS
RESPIRATION RATE: 18 BRPM | BODY MASS INDEX: 31.47 KG/M2 | OXYGEN SATURATION: 99 % | WEIGHT: 195 LBS | DIASTOLIC BLOOD PRESSURE: 75 MMHG | SYSTOLIC BLOOD PRESSURE: 138 MMHG | TEMPERATURE: 98 F | HEART RATE: 91 BPM

## 2024-02-16 DIAGNOSIS — K52.9 GASTROENTERITIS: Primary | ICD-10-CM

## 2024-02-16 PROCEDURE — 25000003 PHARM REV CODE 250: Performed by: INTERNAL MEDICINE

## 2024-02-16 PROCEDURE — 99283 EMERGENCY DEPT VISIT LOW MDM: CPT

## 2024-02-16 RX ORDER — ONDANSETRON 4 MG/1
4 TABLET, ORALLY DISINTEGRATING ORAL EVERY 8 HOURS PRN
Qty: 15 TABLET | Refills: 0 | Status: SHIPPED | OUTPATIENT
Start: 2024-02-16 | End: 2024-02-21

## 2024-02-16 RX ORDER — ONDANSETRON 4 MG/1
4 TABLET, ORALLY DISINTEGRATING ORAL
Status: COMPLETED | OUTPATIENT
Start: 2024-02-16 | End: 2024-02-16

## 2024-02-16 RX ORDER — ALUMINUM HYDROXIDE, MAGNESIUM HYDROXIDE, AND SIMETHICONE 1200; 120; 1200 MG/30ML; MG/30ML; MG/30ML
30 SUSPENSION ORAL ONCE
Status: COMPLETED | OUTPATIENT
Start: 2024-02-16 | End: 2024-02-16

## 2024-02-16 RX ADMIN — ALUMINUM HYDROXIDE, MAGNESIUM HYDROXIDE, AND SIMETHICONE 30 ML: 200; 200; 20 SUSPENSION ORAL at 01:02

## 2024-02-16 RX ADMIN — ONDANSETRON 4 MG: 4 TABLET, ORALLY DISINTEGRATING ORAL at 01:02

## 2024-02-16 NOTE — ED PROVIDER NOTES
Encounter Date: 2024  History from patient     History     Chief Complaint   Patient presents with    Gastroesophageal Reflux     States began yesterday with increased belching that tasted like acid. Began tonight with bloating and 1 episode of vomiting and diarrhea.      HPI    Tony Galindo is 23 y.o. female who  has a past medical history of Anxiety disorder, unspecified, Depression, and Insomnia. arrives in ER with c/o Gastroesophageal Reflux (States began yesterday with increased belching that tasted like acid. Began tonight with bloating and 1 episode of vomiting and diarrhea. )      Review of patient's allergies indicates:  No Known Allergies  Past Medical History:   Diagnosis Date    Anxiety disorder, unspecified     Depression     Insomnia      Past Surgical History:   Procedure Laterality Date     SECTION  2016    Dr. Limon     Family History   Problem Relation Age of Onset    Diabetes Mother     Hypertension Mother     Dementia Mother     Hypertension Father     Diabetes Father     Breast cancer Neg Hx     Cervical cancer Neg Hx     Colon cancer Neg Hx     Ovarian cancer Neg Hx     Uterine cancer Neg Hx      Social History     Tobacco Use    Smoking status: Former     Current packs/day: 0.00     Types: Cigarettes     Quit date: 2015     Years since quittin.2    Smokeless tobacco: Never   Substance Use Topics    Alcohol use: Not Currently    Drug use: Not Currently     Frequency: 2.0 times per week     Types: Marijuana     Review of Systems   Constitutional:  Negative for fever.   HENT:  Negative for trouble swallowing and voice change.    Eyes:  Negative for visual disturbance.   Respiratory:  Negative for cough and shortness of breath.    Cardiovascular:  Negative for chest pain.   Gastrointestinal:  Positive for diarrhea, nausea and vomiting. Negative for abdominal pain.        Cramping in the abdomen, excessive burping   Genitourinary:  Negative for dysuria and hematuria.    Musculoskeletal:  Negative for back pain and gait problem.   Skin:  Negative for color change and rash.   Neurological:  Negative for headaches.   Psychiatric/Behavioral:  Negative for behavioral problems and sleep disturbance.    All other systems reviewed and are negative.      Physical Exam     Initial Vitals [02/16/24 0038]   BP Pulse Resp Temp SpO2   (!) 140/85 99 16 98 °F (36.7 °C) 100 %      MAP       --         Physical Exam    Nursing note and vitals reviewed.  Constitutional: No distress.   HENT:   Head: Atraumatic.   Cardiovascular:  Normal rate and regular rhythm.           Pulmonary/Chest: Breath sounds normal. No respiratory distress.   Abdominal: Abdomen is soft. Bowel sounds are normal.   Musculoskeletal:         General: Normal range of motion.     Neurological: She is alert and oriented to person, place, and time.   Skin: Skin is warm and dry.   Psychiatric: She has a normal mood and affect.         ED Course   Procedures  Labs Reviewed - No data to display       Imaging Results    None          Medications   ondansetron disintegrating tablet 4 mg (has no administration in time range)   aluminum-magnesium hydroxide-simethicone 200-200-20 mg/5 mL suspension 30 mL (has no administration in time range)     Medical Decision Making    Tony Galindo is 23 y.o. female who  has a past medical history of Anxiety disorder, unspecified, Depression, and Insomnia. arrives in ER with c/o Gastroesophageal Reflux (States began yesterday with increased belching that tasted like acid. Began tonight with bloating and 1 episode of vomiting and diarrhea. )      Patient says that yesterday she started having excessive burping, all day she was burping a lot, and then she started having some diarrhea and vomited before coming to the emergency room.  Denies any other complaints, no fever no chills no cough cold congestion no other symptoms.    Risk  OTC drugs.  Prescription drug management.                                       Clinical Impression:  Final diagnoses:  [K52.9] Gastroenteritis (Primary)          ED Disposition Condition    Discharge Stable          ED Prescriptions       Medication Sig Dispense Start Date End Date Auth. Provider    ondansetron (ZOFRAN-ODT) 4 MG TbDL Take 1 tablet (4 mg total) by mouth every 8 (eight) hours as needed. 15 tablet 2/16/2024 2/21/2024 Terri Nieves MD          Follow-up Information       Follow up With Specialties Details Why Contact Info    Vivi Miller MD Family Medicine In 2 days  [address]  [place] [address]  [phone]               Terri Nieves MD  02/16/24 0124

## 2024-03-28 ENCOUNTER — HOSPITAL ENCOUNTER (EMERGENCY)
Facility: HOSPITAL | Age: 24
Discharge: HOME | End: 2024-03-28
Attending: FAMILY MEDICINE
Payer: MEDICAID

## 2024-03-28 VITALS
HEIGHT: 66 IN | OXYGEN SATURATION: 100 % | WEIGHT: 189 LBS | DIASTOLIC BLOOD PRESSURE: 83 MMHG | RESPIRATION RATE: 20 BRPM | SYSTOLIC BLOOD PRESSURE: 126 MMHG | TEMPERATURE: 99 F | BODY MASS INDEX: 30.37 KG/M2 | HEART RATE: 89 BPM

## 2024-03-28 DIAGNOSIS — J02.0 STREP PHARYNGITIS: Primary | ICD-10-CM

## 2024-03-28 LAB
FLUAV AG UPPER RESP QL IA.RAPID: NOT DETECTED
FLUBV AG UPPER RESP QL IA.RAPID: NOT DETECTED
SARS-COV-2 RNA RESP QL NAA+PROBE: NOT DETECTED
STREP A PCR (OHS): DETECTED

## 2024-03-28 PROCEDURE — 99284 EMERGENCY DEPT VISIT MOD MDM: CPT

## 2024-03-28 PROCEDURE — 0240U COVID/FLU A&B PCR: CPT | Performed by: NURSE PRACTITIONER

## 2024-03-28 PROCEDURE — 87651 STREP A DNA AMP PROBE: CPT | Performed by: NURSE PRACTITIONER

## 2024-03-28 RX ORDER — PREDNISONE 10 MG/1
10 TABLET ORAL DAILY
Qty: 21 TABLET | Refills: 0 | Status: SHIPPED | OUTPATIENT
Start: 2024-03-28 | End: 2024-05-14

## 2024-03-28 RX ORDER — AMOXICILLIN 875 MG/1
875 TABLET, FILM COATED ORAL 2 TIMES DAILY
Qty: 20 TABLET | Refills: 0 | Status: SHIPPED | OUTPATIENT
Start: 2024-03-28 | End: 2024-04-07

## 2024-03-28 NOTE — ED PROVIDER NOTES
Encounter Date: 3/28/2024       History     Chief Complaint   Patient presents with    Sore Throat     Sore throat for 2 days     Patient is a 23-year-old female who presents to the emergency department complaints of sore throat x2 days.  States she woke up with a sore throat and since in his been again having headache body aches cough and runny nose.  She denies any fevers or chills.  She denies any nausea vomiting diarrhea.  She denies any known sick contacts.  Patient has taken DayQuil NyQuil with no relief she states.  She denies any other complaints or associated symptoms at this time.      Review of patient's allergies indicates:  No Known Allergies  Past Medical History:   Diagnosis Date    Anxiety disorder, unspecified     Depression     Insomnia      Past Surgical History:   Procedure Laterality Date     SECTION  2016    Dr. Limon     Family History   Problem Relation Age of Onset    Diabetes Mother     Hypertension Mother     Dementia Mother     Hypertension Father     Diabetes Father     Breast cancer Neg Hx     Cervical cancer Neg Hx     Colon cancer Neg Hx     Ovarian cancer Neg Hx     Uterine cancer Neg Hx      Social History     Tobacco Use    Smoking status: Former     Current packs/day: 0.00     Types: Cigarettes     Quit date: 2015     Years since quittin.3    Smokeless tobacco: Never   Substance Use Topics    Alcohol use: Not Currently    Drug use: Not Currently     Frequency: 2.0 times per week     Types: Marijuana     Review of Systems   Constitutional:  Negative for activity change, appetite change and fever.   HENT:  Positive for congestion, postnasal drip, rhinorrhea, sinus pressure, sinus pain, sore throat and trouble swallowing. Negative for ear discharge and ear pain.    Eyes:  Negative for discharge and itching.   Respiratory:  Positive for cough. Negative for chest tightness and shortness of breath.    Cardiovascular:  Negative for chest pain.   Gastrointestinal:   Negative for abdominal distention, abdominal pain and nausea.   Genitourinary:  Negative for dysuria.   Musculoskeletal:  Negative for back pain.   Skin:  Negative for rash.   Neurological:  Negative for weakness.   Hematological:  Does not bruise/bleed easily.   Psychiatric/Behavioral:  Negative for agitation and behavioral problems.    All other systems reviewed and are negative.      Physical Exam     Initial Vitals [03/28/24 1422]   BP Pulse Resp Temp SpO2   126/83 89 20 98.6 °F (37 °C) 100 %      MAP       --         Physical Exam    Nursing note and vitals reviewed.  Constitutional: Vital signs are normal. She appears well-developed and well-nourished.  Non-toxic appearance. She does not have a sickly appearance.   HENT:   Head: Normocephalic and atraumatic.   Right Ear: External ear normal.   Left Ear: External ear normal.   Moderate posterior pharyngeal erythema with edema.  No exudates.   Eyes: Conjunctivae, EOM and lids are normal. Pupils are equal, round, and reactive to light. Lids are everted and swept, no foreign bodies found.   Neck: Trachea normal and phonation normal. Neck supple. No thyroid mass and no thyromegaly present.   Normal range of motion.   Full passive range of motion without pain.     Cardiovascular:  Normal rate, regular rhythm, S1 normal, S2 normal, normal heart sounds, intact distal pulses and normal pulses.           Pulmonary/Chest: Breath sounds normal. No respiratory distress.   Musculoskeletal:      Cervical back: Full passive range of motion without pain, normal range of motion and neck supple.     Lymphadenopathy:     She has no cervical adenopathy.   Neurological: She is alert and oriented to person, place, and time. She has normal strength. GCS score is 15. GCS eye subscore is 4. GCS verbal subscore is 5. GCS motor subscore is 6.   Skin: Skin is warm, dry and intact. Capillary refill takes less than 2 seconds.   Psychiatric: She has a normal mood and affect. Her speech is  normal and behavior is normal. Judgment normal. Cognition and memory are normal.         ED Course   Procedures  Labs Reviewed   STREP GROUP A BY PCR - Abnormal; Notable for the following components:       Result Value    STREP A PCR ([facility]) Detected (*)     All other components within normal limits    Narrative:     The Xpert Xpress Strep A test is a rapid, qualitative in vitro diagnostic test for the detection of Streptococcus pyogenes (Group A ß-hemolytic Streptococcus, Strep A) in throat swab specimens from patients with signs and symptoms of pharyngitis.     COVID/FLU A&B PCR - Normal    Narrative:     The Xpert Xpress SARS-CoV-2/FLU/RSV plus is a rapid, multiplexed real-time PCR test intended for the simultaneous qualitative detection and differentiation of SARS-CoV-2, Influenza A, Influenza B, and respiratory syncytial virus (RSV) viral RNA in either nasopharyngeal swab or nasal swab specimens.                Imaging Results    None          Medications - No data to display  Medical Decision Making  Patient is a 23-year-old female who presents to the emergency department complaints of sore throat x2 days.  States she woke up with a sore throat and since in his been again having headache body aches cough and runny nose.  She denies any fevers or chills.  She denies any nausea vomiting diarrhea.  She denies any known sick contacts.  Patient has taken DayQuil NyQuil with no relief she states.  She denies any other complaints or associated symptoms at this time.    Problems Addressed:  Strep pharyngitis: acute illness or injury     Details: Patient had swabs COVID flu strep positive here for strep.  Discussed antibiotics with the patient outpatient leave.  Did discuss risks benefits of steroids which patient did elect to get steroid prescription for the pain she is having in her throat.  Will send these to the pharmacy instructed her to complete full course.  Discussed Tylenol Motrin for any fevers chills.  Strict  ED return precautions discussed for any change or worsening symptoms.    Amount and/or Complexity of Data Reviewed  Labs: ordered. Decision-making details documented in ED Course.    Risk  Prescription drug management.                                      Clinical Impression:  Final diagnoses:  [J02.0] Strep pharyngitis (Primary)          ED Disposition Condition    Discharge Stable          ED Prescriptions       Medication Sig Dispense Start Date End Date Auth. Provider    amoxicillin (AMOXIL) 875 MG tablet Take 1 tablet (875 mg total) by mouth 2 (two) times daily. for 10 days 20 tablet 3/28/2024 4/7/2024 Obed Doty FNP    predniSONE (DELTASONE) 10 MG tablet Take 1 tablet (10 mg total) by mouth once daily. Take 4 tabs x 3 days, then take 2 tabs x 3 days, then take 1 tab x 3 days. 21 tablet 3/28/2024 -- Obed Doty FNP          Follow-up Information       Follow up With Specialties Details Why Contact Info    Vivi Miller MD Family Medicine Schedule an appointment as soon as possible for a visit in 2 days For ER Follow Up. [address] [place] [address]  [phone]               Obed Doty FNP  03/28/24 7030

## 2024-03-28 NOTE — Clinical Note
"Tony"Omar Galindo was seen and treated in our emergency department on 3/28/2024.  She may return to work on 03/31/2024.       If you have any questions or concerns, please don't hesitate to call.      Obed Doty, GABINO"

## 2024-05-10 ENCOUNTER — LAB VISIT (OUTPATIENT)
Dept: LAB | Facility: HOSPITAL | Age: 24
End: 2024-05-10
Attending: FAMILY MEDICINE
Payer: MEDICAID

## 2024-05-10 DIAGNOSIS — E55.9 VITAMIN D DEFICIENCY: ICD-10-CM

## 2024-05-10 DIAGNOSIS — E78.00 HIGH CHOLESTEROL: Primary | ICD-10-CM

## 2024-05-10 LAB
25(OH)D3+25(OH)D2 SERPL-MCNC: 13 NG/ML (ref 30–80)
ALBUMIN SERPL-MCNC: 3.4 G/DL (ref 3.5–5)
ALBUMIN/GLOB SERPL: 0.9 RATIO (ref 1.1–2)
ALP SERPL-CCNC: 88 UNIT/L (ref 40–150)
ALT SERPL-CCNC: 19 UNIT/L (ref 0–55)
AST SERPL-CCNC: 25 UNIT/L (ref 5–34)
BACTERIA #/AREA URNS AUTO: ABNORMAL /HPF
BASOPHILS # BLD AUTO: 0.03 X10(3)/MCL
BASOPHILS NFR BLD AUTO: 0.4 %
BILIRUB SERPL-MCNC: 0.3 MG/DL
BILIRUB UR QL STRIP.AUTO: NEGATIVE
BUN SERPL-MCNC: 11 MG/DL (ref 7–18.7)
CALCIUM SERPL-MCNC: 8.6 MG/DL (ref 8.4–10.2)
CHLORIDE SERPL-SCNC: 112 MMOL/L (ref 98–107)
CHOLEST SERPL-MCNC: 141 MG/DL
CHOLEST/HDLC SERPL: 3 {RATIO} (ref 0–5)
CLARITY UR: ABNORMAL
CO2 SERPL-SCNC: 23 MMOL/L (ref 22–29)
COLOR UR AUTO: YELLOW
CREAT SERPL-MCNC: 0.81 MG/DL (ref 0.55–1.02)
EOSINOPHIL # BLD AUTO: 0.21 X10(3)/MCL (ref 0–0.9)
EOSINOPHIL NFR BLD AUTO: 2.7 %
ERYTHROCYTE [DISTWIDTH] IN BLOOD BY AUTOMATED COUNT: 15.6 % (ref 11.5–17)
EST. AVERAGE GLUCOSE BLD GHB EST-MCNC: 105.4 MG/DL
GFR SERPLBLD CREATININE-BSD FMLA CKD-EPI: >60 ML/MIN/1.73/M2
GLOBULIN SER-MCNC: 3.7 GM/DL (ref 2.4–3.5)
GLUCOSE SERPL-MCNC: 90 MG/DL (ref 74–100)
GLUCOSE UR QL STRIP: NEGATIVE
HBA1C MFR BLD: 5.3 %
HCT VFR BLD AUTO: 24.6 % (ref 37–47)
HDLC SERPL-MCNC: 43 MG/DL (ref 35–60)
HGB BLD-MCNC: 7.1 G/DL (ref 12–16)
HGB UR QL STRIP: ABNORMAL
IMM GRANULOCYTES # BLD AUTO: 0.01 X10(3)/MCL (ref 0–0.04)
IMM GRANULOCYTES NFR BLD AUTO: 0.1 %
KETONES UR QL STRIP: NEGATIVE
LDLC SERPL CALC-MCNC: 74 MG/DL (ref 50–140)
LEUKOCYTE ESTERASE UR QL STRIP: ABNORMAL
LYMPHOCYTES # BLD AUTO: 3.12 X10(3)/MCL (ref 0.6–4.6)
LYMPHOCYTES NFR BLD AUTO: 39.4 %
MCH RBC QN AUTO: 21.6 PG (ref 27–31)
MCHC RBC AUTO-ENTMCNC: 28.9 G/DL (ref 33–36)
MCV RBC AUTO: 75 FL (ref 80–94)
MONOCYTES # BLD AUTO: 0.56 X10(3)/MCL (ref 0.1–1.3)
MONOCYTES NFR BLD AUTO: 7.1 %
MUCOUS THREADS URNS QL MICRO: ABNORMAL /LPF
NEUTROPHILS # BLD AUTO: 3.98 X10(3)/MCL (ref 2.1–9.2)
NEUTROPHILS NFR BLD AUTO: 50.3 %
NITRITE UR QL STRIP: NEGATIVE
PH UR STRIP: 6 [PH]
PLATELET # BLD AUTO: 303 X10(3)/MCL (ref 130–400)
PMV BLD AUTO: 11.9 FL (ref 7.4–10.4)
POTASSIUM SERPL-SCNC: 3.9 MMOL/L (ref 3.5–5.1)
PROT SERPL-MCNC: 7.1 GM/DL (ref 6.4–8.3)
PROT UR QL STRIP: ABNORMAL
RBC # BLD AUTO: 3.28 X10(6)/MCL (ref 4.2–5.4)
RBC #/AREA URNS AUTO: ABNORMAL /HPF
SODIUM SERPL-SCNC: 139 MMOL/L (ref 136–145)
SP GR UR STRIP.AUTO: >=1.03 (ref 1–1.03)
SQUAMOUS #/AREA URNS AUTO: ABNORMAL /HPF
T4 FREE SERPL-MCNC: 0.91 NG/DL (ref 0.7–1.48)
TRIGL SERPL-MCNC: 120 MG/DL (ref 37–140)
TSH SERPL-ACNC: 3.33 UIU/ML (ref 0.35–4.94)
UROBILINOGEN UR STRIP-ACNC: 1
VLDLC SERPL CALC-MCNC: 24 MG/DL
WBC # SPEC AUTO: 7.91 X10(3)/MCL (ref 4.5–11.5)
WBC #/AREA URNS AUTO: ABNORMAL /HPF

## 2024-05-10 PROCEDURE — 80053 COMPREHEN METABOLIC PANEL: CPT

## 2024-05-10 PROCEDURE — 81001 URINALYSIS AUTO W/SCOPE: CPT

## 2024-05-10 PROCEDURE — 82306 VITAMIN D 25 HYDROXY: CPT

## 2024-05-10 PROCEDURE — 84443 ASSAY THYROID STIM HORMONE: CPT

## 2024-05-10 PROCEDURE — 83036 HEMOGLOBIN GLYCOSYLATED A1C: CPT

## 2024-05-10 PROCEDURE — 85025 COMPLETE CBC W/AUTO DIFF WBC: CPT

## 2024-05-10 PROCEDURE — 87086 URINE CULTURE/COLONY COUNT: CPT

## 2024-05-10 PROCEDURE — 80061 LIPID PANEL: CPT

## 2024-05-10 PROCEDURE — 84439 ASSAY OF FREE THYROXINE: CPT

## 2024-05-10 PROCEDURE — 36415 COLL VENOUS BLD VENIPUNCTURE: CPT

## 2024-05-11 ENCOUNTER — HOSPITAL ENCOUNTER (EMERGENCY)
Facility: HOSPITAL | Age: 24
Discharge: HOME | End: 2024-05-11
Attending: EMERGENCY MEDICINE
Payer: MEDICAID

## 2024-05-11 VITALS
HEIGHT: 66 IN | OXYGEN SATURATION: 99 % | SYSTOLIC BLOOD PRESSURE: 123 MMHG | RESPIRATION RATE: 18 BRPM | HEART RATE: 90 BPM | BODY MASS INDEX: 30.7 KG/M2 | TEMPERATURE: 98 F | WEIGHT: 191 LBS | DIASTOLIC BLOOD PRESSURE: 62 MMHG

## 2024-05-11 DIAGNOSIS — L08.9 SKIN INFECTION: ICD-10-CM

## 2024-05-11 DIAGNOSIS — T78.40XA ALLERGIC REACTION, INITIAL ENCOUNTER: Primary | ICD-10-CM

## 2024-05-11 PROCEDURE — 99283 EMERGENCY DEPT VISIT LOW MDM: CPT

## 2024-05-11 RX ORDER — DIPHENHYDRAMINE HCL 25 MG
25 CAPSULE ORAL EVERY 6 HOURS PRN
Qty: 20 CAPSULE | Refills: 0 | Status: SHIPPED | OUTPATIENT
Start: 2024-05-11

## 2024-05-11 RX ORDER — SULFAMETHOXAZOLE AND TRIMETHOPRIM 800; 160 MG/1; MG/1
1 TABLET ORAL 2 TIMES DAILY
Qty: 14 TABLET | Refills: 0 | Status: SHIPPED | OUTPATIENT
Start: 2024-05-11 | End: 2024-05-18

## 2024-05-11 NOTE — ED PROVIDER NOTES
Encounter Date: 2024       History     Chief Complaint   Patient presents with    Leg Pain     At home tattoo done on Thursday, pain and swelling noted yesterday to R thigh     See MDM for details     The history is provided by the patient.     Review of patient's allergies indicates:  No Known Allergies  Past Medical History:   Diagnosis Date    Anxiety disorder, unspecified     Depression     Insomnia      Past Surgical History:   Procedure Laterality Date     SECTION  2016    Dr. Limon     Family History   Problem Relation Name Age of Onset    Diabetes Mother      Hypertension Mother      Dementia Mother      Hypertension Father      Diabetes Father      Breast cancer Neg Hx      Cervical cancer Neg Hx      Colon cancer Neg Hx      Ovarian cancer Neg Hx      Uterine cancer Neg Hx       Social History     Tobacco Use    Smoking status: Former     Current packs/day: 0.00     Types: Cigarettes     Quit date: 2015     Years since quittin.4    Smokeless tobacco: Never   Substance Use Topics    Alcohol use: Not Currently    Drug use: Not Currently     Frequency: 2.0 times per week     Types: Marijuana     Review of Systems   Constitutional:  Negative for chills and fever.   Respiratory:  Negative for shortness of breath.    Gastrointestinal:  Negative for nausea and vomiting.   Skin:  Negative for color change.        Itching   Neurological:  Negative for weakness and numbness.   All other systems reviewed and are negative.      Physical Exam     Initial Vitals   BP Pulse Resp Temp SpO2   24 1417 24 1414 24 1414 24 1414 24 1414   123/62 87 18 98.4 °F (36.9 °C) 100 %      MAP       --                Physical Exam    Nursing note and vitals reviewed.  Constitutional: She appears well-developed and well-nourished. No distress.   HENT:   Head: Normocephalic and atraumatic.   Eyes: Conjunctivae and EOM are normal.   Neck:   Normal range of motion.  Cardiovascular:   Normal rate and regular rhythm.           Pulmonary/Chest: Breath sounds normal. No respiratory distress.   Musculoskeletal:         General: Normal range of motion.      Cervical back: Normal range of motion.     Neurological: She is alert and oriented to person, place, and time. She has normal strength. GCS score is 15. GCS eye subscore is 4. GCS verbal subscore is 5. GCS motor subscore is 6.   Skin: Skin is warm and dry.   Slightly raised, non-erythematous tattoo to right anterior thigh. Mild warmth. Mildly TTP per patient. No drainage or fluctuance    Psychiatric: She has a normal mood and affect. Thought content normal.         ED Course   Procedures  Labs Reviewed - No data to display       Imaging Results    None          Medications - No data to display  Medical Decision Making    23-year-old female presents to the ER for evaluation of right thigh pain x2 days.  Patient reports that she had a at-home tattoo done 2 days ago to her anterior right thigh.  She reports that her boyfriend perform the tattoo and he has not a professional .  Patient reports that he had previously use the tattooed machine and then use on her.  She is unsure if he changes in the needle.  Patient reports that since then she has had some mild right thigh pain and some diffuse itching.  She denies any fever or chills.  She denies any drainage from the tattoo site.      Tattoo does not appear obviously infectious.  It is mildly warm.  No active drainage or fluctuance to the area.  Mildly tender to palpation.  Patient reports that she is diffusely itchy.  No obvious diffuse rash.  She denies taking any over-the-counter medications for these symptoms.  Recommended that we treat with Benadryl and Bactrim.  Recommended to keep the area moisturized.  Recommend Tylenol and Motrin for discomfort.  Return to ER precautions were reviewed.  Patient verbalized her understanding.      Additional MDM:   Differential Diagnosis:   Other:  The following diagnoses were also considered and will be evaluated: allergic reaction, cellulitis and abscess.                                   Clinical Impression:  Final diagnoses:  [T78.40XA] Allergic reaction, initial encounter (Primary)  [L08.9] Skin infection          ED Disposition Condition    Discharge Stable          ED Prescriptions       Medication Sig Dispense Start Date End Date Auth. Provider    diphenhydrAMINE (BENADRYL) 25 mg capsule Take 1 capsule (25 mg total) by mouth every 6 (six) hours as needed for Itching or Allergies. 20 capsule 5/11/2024 -- Rosalie Garcia PA    sulfamethoxazole-trimethoprim 800-160mg (BACTRIM DS) 800-160 mg Tab Take 1 tablet by mouth 2 (two) times daily. for 7 days 14 tablet 5/11/2024 5/18/2024 Rosalie Garcia PA          Follow-up Information       Follow up With Specialties Details Why Contact Info    Vivi Miller MD Family Medicine Schedule an appointment as soon as possible for a visit in 2 weeks As needed, If symptoms worsen [address] [place] [address]  [phone]               Rosalie Garcia PA  05/11/24 0940

## 2024-05-11 NOTE — DISCHARGE INSTRUCTIONS
Take Bactrim 2 times daily for 7 days for antibiotic coverage.  Additionally, you may take Benadryl every 6 hours as needed for itching.      Recommend that you keep the skin moist.  Recommend a non scented moisturizer.   For pain, recommend ibuprofen.  You may rotate this with over-the-counter Tylenol.    Return to ER for worsening symptoms.  Recommend follow up with primary care.

## 2024-05-12 LAB — BACTERIA UR CULT: NORMAL

## 2024-05-13 ENCOUNTER — LAB VISIT (OUTPATIENT)
Dept: LAB | Facility: HOSPITAL | Age: 24
End: 2024-05-13
Attending: FAMILY MEDICINE
Payer: MEDICAID

## 2024-05-13 DIAGNOSIS — D64.9 ANEMIA, UNSPECIFIED TYPE: Primary | ICD-10-CM

## 2024-05-13 LAB
FERRITIN SERPL-MCNC: 7.77 NG/ML (ref 4.63–204)
IRON SATN MFR SERPL: 4 % (ref 20–50)
IRON SERPL-MCNC: 17 UG/DL (ref 50–170)
TIBC SERPL-MCNC: 365 UG/DL (ref 70–310)
TIBC SERPL-MCNC: 382 UG/DL (ref 250–450)

## 2024-05-13 PROCEDURE — 36415 COLL VENOUS BLD VENIPUNCTURE: CPT

## 2024-05-13 PROCEDURE — 83021 HEMOGLOBIN CHROMOTOGRAPHY: CPT

## 2024-05-13 PROCEDURE — 82728 ASSAY OF FERRITIN: CPT

## 2024-05-13 PROCEDURE — 83540 ASSAY OF IRON: CPT

## 2024-05-14 ENCOUNTER — HOSPITAL ENCOUNTER (EMERGENCY)
Facility: HOSPITAL | Age: 24
Discharge: HOME | End: 2024-05-14
Attending: INTERNAL MEDICINE
Payer: MEDICAID

## 2024-05-14 VITALS
HEART RATE: 84 BPM | SYSTOLIC BLOOD PRESSURE: 134 MMHG | TEMPERATURE: 99 F | DIASTOLIC BLOOD PRESSURE: 81 MMHG | BODY MASS INDEX: 30.54 KG/M2 | OXYGEN SATURATION: 99 % | WEIGHT: 189.19 LBS | RESPIRATION RATE: 16 BRPM

## 2024-05-14 DIAGNOSIS — R11.2 NAUSEA AND VOMITING, UNSPECIFIED VOMITING TYPE: ICD-10-CM

## 2024-05-14 DIAGNOSIS — D50.0 IRON DEFICIENCY ANEMIA DUE TO CHRONIC BLOOD LOSS: Primary | ICD-10-CM

## 2024-05-14 LAB
BASOPHILS # BLD AUTO: 0.03 X10(3)/MCL
BASOPHILS NFR BLD AUTO: 0.3 %
EOSINOPHIL # BLD AUTO: 0.33 X10(3)/MCL (ref 0–0.9)
EOSINOPHIL NFR BLD AUTO: 3.3 %
ERYTHROCYTE [DISTWIDTH] IN BLOOD BY AUTOMATED COUNT: 15.4 % (ref 11.5–17)
HCT VFR BLD AUTO: 24.6 % (ref 37–47)
HEMOCCULT SP1 STL QL: NEGATIVE
HGB BLD-MCNC: 7.3 G/DL (ref 12–16)
IMM GRANULOCYTES # BLD AUTO: 0.02 X10(3)/MCL (ref 0–0.04)
IMM GRANULOCYTES NFR BLD AUTO: 0.2 %
LYMPHOCYTES # BLD AUTO: 3.87 X10(3)/MCL (ref 0.6–4.6)
LYMPHOCYTES NFR BLD AUTO: 38.7 %
MCH RBC QN AUTO: 21.7 PG (ref 27–31)
MCHC RBC AUTO-ENTMCNC: 29.7 G/DL (ref 33–36)
MCV RBC AUTO: 73 FL (ref 80–94)
MONOCYTES # BLD AUTO: 0.78 X10(3)/MCL (ref 0.1–1.3)
MONOCYTES NFR BLD AUTO: 7.8 %
NEUTROPHILS # BLD AUTO: 4.97 X10(3)/MCL (ref 2.1–9.2)
NEUTROPHILS NFR BLD AUTO: 49.7 %
PLATELET # BLD AUTO: 298 X10(3)/MCL (ref 130–400)
PMV BLD AUTO: 10.7 FL (ref 7.4–10.4)
RBC # BLD AUTO: 3.37 X10(6)/MCL (ref 4.2–5.4)
RET# (OHS): 0.06 X10E6/UL (ref 0.02–0.08)
RETICULOCYTE COUNT AUTOMATED (OLG): 1.89 % (ref 1.1–2.1)
WBC # SPEC AUTO: 10 X10(3)/MCL (ref 4.5–11.5)

## 2024-05-14 PROCEDURE — 85045 AUTOMATED RETICULOCYTE COUNT: CPT | Performed by: INTERNAL MEDICINE

## 2024-05-14 PROCEDURE — 99284 EMERGENCY DEPT VISIT MOD MDM: CPT

## 2024-05-14 PROCEDURE — 85025 COMPLETE CBC W/AUTO DIFF WBC: CPT | Performed by: INTERNAL MEDICINE

## 2024-05-14 PROCEDURE — 82272 OCCULT BLD FECES 1-3 TESTS: CPT | Performed by: INTERNAL MEDICINE

## 2024-05-14 PROCEDURE — 25000003 PHARM REV CODE 250: Performed by: INTERNAL MEDICINE

## 2024-05-14 RX ORDER — ONDANSETRON 4 MG/1
4 TABLET, FILM COATED ORAL EVERY 6 HOURS
Qty: 12 TABLET | Refills: 0 | Status: SHIPPED | OUTPATIENT
Start: 2024-05-14

## 2024-05-14 RX ORDER — ONDANSETRON 4 MG/1
4 TABLET, ORALLY DISINTEGRATING ORAL
Status: COMPLETED | OUTPATIENT
Start: 2024-05-14 | End: 2024-05-14

## 2024-05-14 RX ORDER — FERROUS SULFATE TAB 325 MG (65 MG ELEMENTAL FE) 325 (65 FE) MG
325 TAB ORAL 2 TIMES DAILY
Qty: 180 TABLET | Refills: 1 | Status: SHIPPED | OUTPATIENT
Start: 2024-05-14

## 2024-05-14 RX ADMIN — ONDANSETRON 4 MG: 4 TABLET, ORALLY DISINTEGRATING ORAL at 06:05

## 2024-05-14 NOTE — ED PROVIDER NOTES
Encounter Date: 2024  History from patient     History     Chief Complaint   Patient presents with    Emesis     N/V and generalized abd pain since starting bactrim 2 days ago. Also concerned with recent labs and anemia      HPI    Tony Galindo is 23 y.o. female who  has a past medical history of Anxiety disorder, unspecified, Depression, and Insomnia. arrives in ER with c/o Emesis (N/V and generalized abd pain since starting bactrim 2 days ago. Also concerned with recent labs and anemia )      Review of patient's allergies indicates:  No Known Allergies  Past Medical History:   Diagnosis Date    Anxiety disorder, unspecified     Depression     Insomnia      Past Surgical History:   Procedure Laterality Date     SECTION  2016    Dr. Limon     Family History   Problem Relation Name Age of Onset    Diabetes Mother      Hypertension Mother      Dementia Mother      Hypertension Father      Diabetes Father      Breast cancer Neg Hx      Cervical cancer Neg Hx      Colon cancer Neg Hx      Ovarian cancer Neg Hx      Uterine cancer Neg Hx       Social History     Tobacco Use    Smoking status: Former     Current packs/day: 0.00     Types: Cigarettes     Quit date: 2015     Years since quittin.4    Smokeless tobacco: Never   Substance Use Topics    Alcohol use: Not Currently    Drug use: Not Currently     Frequency: 2.0 times per week     Types: Marijuana     Review of Systems   Constitutional:  Negative for fever.   HENT:  Negative for trouble swallowing and voice change.    Eyes:  Negative for visual disturbance.   Respiratory:  Negative for cough and shortness of breath.    Cardiovascular:  Negative for chest pain.   Gastrointestinal:  Positive for abdominal pain, nausea and vomiting. Negative for diarrhea.   Genitourinary:  Positive for menstrual problem. Negative for dysuria and hematuria.   Musculoskeletal:  Negative for back pain and gait problem.   Skin:  Negative for color change and  rash.   Neurological:  Negative for headaches.   Psychiatric/Behavioral:  Negative for behavioral problems and sleep disturbance.    All other systems reviewed and are negative.      Physical Exam     Initial Vitals [05/14/24 0610]   BP Pulse Resp Temp SpO2   (!) 143/99 90 18 98.9 °F (37.2 °C) 100 %      MAP       --         Physical Exam    Nursing note and vitals reviewed.  Constitutional: She appears well-developed and well-nourished. No distress.   HENT:   Head: Atraumatic.   Eyes: EOM are normal.   Neck: Neck supple.   Cardiovascular:  Normal rate and regular rhythm.           Pulmonary/Chest: Breath sounds normal. No respiratory distress.   Abdominal: Abdomen is soft. Bowel sounds are normal. She exhibits no distension and no mass. There is no abdominal tenderness. There is no rebound and no guarding.   Musculoskeletal:         General: Normal range of motion.      Cervical back: Neck supple.     Neurological: She is alert and oriented to person, place, and time.   Skin: Skin is warm and dry.   Psychiatric: She has a normal mood and affect.         ED Course   Procedures  Orders Placed This Encounter   Procedures    Occult Blood, Stool 1st Specimen    CBC auto differential    CBC with Differential    Reticulocytes     Medications   ondansetron disintegrating tablet 4 mg (4 mg Oral Given 5/14/24 0645)     Admission on 05/14/2024   Component Date Value Ref Range Status    Occult Blood Stool 1 05/14/2024 Negative  Negative Final    WBC 05/14/2024 10.00  4.50 - 11.50 x10(3)/mcL Final    RBC 05/14/2024 3.37 (L)  4.20 - 5.40 x10(6)/mcL Final    Hgb 05/14/2024 7.3 (L)  12.0 - 16.0 g/dL Final    Hct 05/14/2024 24.6 (L)  37.0 - 47.0 % Final    MCV 05/14/2024 73.0 (L)  80.0 - 94.0 fL Final    MCH 05/14/2024 21.7 (L)  27.0 - 31.0 pg Final    MCHC 05/14/2024 29.7 (L)  33.0 - 36.0 g/dL Final    RDW 05/14/2024 15.4  11.5 - 17.0 % Final    Platelet 05/14/2024 298  130 - 400 x10(3)/mcL Final    MPV 05/14/2024 10.7 (H)  7.4 -  10.4 fL Final    Neut % 05/14/2024 49.7  % Final    Lymph % 05/14/2024 38.7  % Final    Mono % 05/14/2024 7.8  % Final    Eos % 05/14/2024 3.3  % Final    Basophil % 05/14/2024 0.3  % Final    Lymph # 05/14/2024 3.87  0.6 - 4.6 x10(3)/mcL Final    Neut # 05/14/2024 4.97  2.1 - 9.2 x10(3)/mcL Final    Mono # 05/14/2024 0.78  0.1 - 1.3 x10(3)/mcL Final    Eos # 05/14/2024 0.33  0 - 0.9 x10(3)/mcL Final    Baso # 05/14/2024 0.03  <=0.2 x10(3)/mcL Final    IG# 05/14/2024 0.02  0 - 0.04 x10(3)/mcL Final    IG% 05/14/2024 0.2  % Final    Retic Cnt Auto 05/14/2024 1.89  1.1 - 2.1 % Final    RET# 05/14/2024 0.0637  0.016 - 0.078 x10e6/uL Final       Labs Reviewed   CBC WITH DIFFERENTIAL - Abnormal; Notable for the following components:       Result Value    RBC 3.37 (*)     Hgb 7.3 (*)     Hct 24.6 (*)     MCV 73.0 (*)     MCH 21.7 (*)     MCHC 29.7 (*)     MPV 10.7 (*)     All other components within normal limits   OCCULT BLOOD, STOOL 1ST SPECIMEN - Normal   RETICULOCYTES - Normal   CBC W/ AUTO DIFFERENTIAL    Narrative:     The following orders were created for panel order CBC auto differential.  Procedure                               Abnormality         Status                     ---------                               -----------         ------                     CBC with Differential[[id number]]       Abnormal            Final result                 Please view results for these tests on the individual orders.          Imaging Results    None          Medications   ondansetron disintegrating tablet 4 mg (4 mg Oral Given 5/14/24 0645)     Medical Decision Making    Tony Galindo is 23 y.o. female who  has a past medical history of Anxiety disorder, unspecified, Depression, and Insomnia. arrives in ER with c/o Emesis (N/V and generalized abd pain since starting bactrim 2 days ago. Also concerned with recent labs and anemia )    Patient says that she had gone to her family doctor who ordered some blood work on her and  then they called her back saying that the she needs to come back because it is he something on blood, they put her on some birth control pill because she has been having.  For the last 3 weeks or so, but she does not know the name of the medication, then she had come to the emergency room and they put her on Bactrim DS for infection in 1 of her tattoos on the right thigh, and then she started having upset stomach, nausea and vomiting, and now she is very concerned that the she was told that she has severe anemia.    On examination patient is day 2 looks okay at this time, does not show any signs of any major abscess formation or cellulitis anymore, her abdomen is soft, nondistended, minimal epigastric tenderness    A decided do a rectal exam on her and her stool occult blood is negative, also decided to repeat her CBC and retic count.    Amount and/or Complexity of Data Reviewed  Labs: ordered.    Risk  Prescription drug management.               ED Course as of 05/14/24 0656   Tue May 14, 2024   0647 Hemoglobin(!): 7.3 [GQ]   0647 Hematocrit(!): 24.6 [GQ]   0647 MCV(!): 73.0 [GQ]   0647 Retic: 1.89 [GQ]   0647 Occult Blood: Negative  Patient's stool occult blood is negative, she has already been started on medicines for her prolonged period, her retic count is only 1.89, her white count is normal, her H&H is 7.3 and 24.6, I see that she has been on iron in the past, but she has not taking it, I will go ahead and prescribe her iron again, I will also prescribe her some Zofran for nausea and vomiting, I advised her that she should continue taking the Bactrim DS for at least 2 more days because she does have a urinary tract infection and the tattoo is looking better, I advised her that she can also eat some yogurt to help her with the stomach upset, and see her family doctor for follow-up. [GQ]   0655 I advised patient that she must take her birth control pill regularly, and must take her iron pill.  She used to be on  it but she stopped taking it.  And see her family doctor for re-evaluation. [GQ]      ED Course User Index  [GQ] Terri Nieves MD                           Clinical Impression:  Final diagnoses:  [D50.0] Iron deficiency anemia due to chronic blood loss (Primary)  [R11.2] Nausea and vomiting, unspecified vomiting type          ED Disposition Condition    Discharge Stable          ED Prescriptions       Medication Sig Dispense Start Date End Date Auth. Provider    FEROSUL 325 mg (65 mg iron) Tab tablet Take 1 tablet (325 mg total) by mouth 2 (two) times daily. 180 tablet 5/14/2024 -- Terri Nieves MD    ondansetron (ZOFRAN) 4 MG tablet Take 1 tablet (4 mg total) by mouth every 6 (six) hours. 12 tablet 5/14/2024 -- Terri Nieves MD          Follow-up Information       Follow up With Specialties Details Why Contact Info    Vivi Miller MD Family Medicine In 2 days  [address] [place] [address]  [phone]               Terri Nieves MD  05/14/24 0640

## 2024-05-15 LAB
HGB A MFR BLD ELPH: 98.1 % (ref 95.8–98)
HGB A2 MFR BLD ELPH: 1.9 % (ref 2–3.3)
HGB F MFR BLD ELPH: 0 % (ref 0–0.9)
HGB FRACT BLD ELPH-IMP: ABNORMAL
M HPLC HB VARIANT, B: ABNORMAL

## 2024-10-02 ENCOUNTER — HOSPITAL ENCOUNTER (OUTPATIENT)
Dept: RADIOLOGY | Facility: HOSPITAL | Age: 24
Discharge: HOME | End: 2024-10-02
Attending: EMERGENCY MEDICINE
Payer: MEDICAID

## 2024-10-02 ENCOUNTER — CLINICAL SUPPORT (OUTPATIENT)
Dept: RESPIRATORY THERAPY | Facility: HOSPITAL | Age: 24
End: 2024-10-02
Attending: EMERGENCY MEDICINE
Payer: MEDICAID

## 2024-10-02 DIAGNOSIS — R06.02 SHORTNESS OF BREATH: ICD-10-CM

## 2024-10-02 DIAGNOSIS — R07.9 CHEST PAIN: Primary | ICD-10-CM

## 2024-10-02 DIAGNOSIS — R07.9 CHEST PAIN: ICD-10-CM

## 2024-10-02 LAB
OHS QRS DURATION: 72 MS
OHS QTC CALCULATION: 392 MS

## 2024-10-02 PROCEDURE — 71046 X-RAY EXAM CHEST 2 VIEWS: CPT | Mod: TC

## 2024-10-02 PROCEDURE — 93005 ELECTROCARDIOGRAM TRACING: CPT

## 2024-10-02 PROCEDURE — 93010 ELECTROCARDIOGRAM REPORT: CPT | Mod: ,,, | Performed by: INTERNAL MEDICINE

## 2024-11-13 ENCOUNTER — OFFICE VISIT (OUTPATIENT)
Dept: FAMILY MEDICINE | Facility: CLINIC | Age: 24
End: 2024-11-13
Payer: MEDICAID

## 2024-11-13 VITALS
WEIGHT: 164 LBS | OXYGEN SATURATION: 100 % | TEMPERATURE: 98 F | HEART RATE: 87 BPM | SYSTOLIC BLOOD PRESSURE: 124 MMHG | DIASTOLIC BLOOD PRESSURE: 77 MMHG | BODY MASS INDEX: 26.36 KG/M2 | HEIGHT: 66 IN | RESPIRATION RATE: 16 BRPM

## 2024-11-13 DIAGNOSIS — R79.89 LOW SERUM VITAMIN D: ICD-10-CM

## 2024-11-13 DIAGNOSIS — R00.2 PALPITATIONS: ICD-10-CM

## 2024-11-13 DIAGNOSIS — F32.A DEPRESSION, UNSPECIFIED DEPRESSION TYPE: ICD-10-CM

## 2024-11-13 DIAGNOSIS — Z11.59 NEED FOR HEPATITIS C SCREENING TEST: ICD-10-CM

## 2024-11-13 DIAGNOSIS — F41.9 ANXIETY: ICD-10-CM

## 2024-11-13 DIAGNOSIS — Z76.89 ENCOUNTER TO ESTABLISH CARE: Primary | ICD-10-CM

## 2024-11-13 PROCEDURE — 3074F SYST BP LT 130 MM HG: CPT | Mod: CPTII,,, | Performed by: NURSE PRACTITIONER

## 2024-11-13 PROCEDURE — 99213 OFFICE O/P EST LOW 20 MIN: CPT | Mod: PBBFAC | Performed by: NURSE PRACTITIONER

## 2024-11-13 PROCEDURE — 3044F HG A1C LEVEL LT 7.0%: CPT | Mod: CPTII,,, | Performed by: NURSE PRACTITIONER

## 2024-11-13 PROCEDURE — 1159F MED LIST DOCD IN RCRD: CPT | Mod: CPTII,,, | Performed by: NURSE PRACTITIONER

## 2024-11-13 PROCEDURE — 1160F RVW MEDS BY RX/DR IN RCRD: CPT | Mod: CPTII,,, | Performed by: NURSE PRACTITIONER

## 2024-11-13 PROCEDURE — 3008F BODY MASS INDEX DOCD: CPT | Mod: CPTII,,, | Performed by: NURSE PRACTITIONER

## 2024-11-13 PROCEDURE — 99999 PR PBB SHADOW E&M-EST. PATIENT-LVL III: CPT | Mod: PBBFAC,,, | Performed by: NURSE PRACTITIONER

## 2024-11-13 PROCEDURE — 3078F DIAST BP <80 MM HG: CPT | Mod: CPTII,,, | Performed by: NURSE PRACTITIONER

## 2024-11-13 PROCEDURE — 99205 OFFICE O/P NEW HI 60 MIN: CPT | Mod: S$PBB,,, | Performed by: NURSE PRACTITIONER

## 2024-11-13 RX ORDER — ASPIRIN 325 MG
50000 TABLET, DELAYED RELEASE (ENTERIC COATED) ORAL
COMMUNITY
Start: 2024-10-02

## 2024-11-13 RX ORDER — PAROXETINE HYDROCHLORIDE 20 MG/1
20 TABLET, FILM COATED ORAL EVERY MORNING
Qty: 30 TABLET | Refills: 11 | Status: SHIPPED | OUTPATIENT
Start: 2024-11-13 | End: 2025-11-13

## 2024-11-13 NOTE — PROGRESS NOTES
Family Medicine    Patient ID: [id number]     Chief Complaint: Establish Care      HPI:     Tony Galindo is here today to establish care.  Patient has labs ordered.  Patient is advised to return to clinic in 1 week to the over her labs.  Patient does note that she does have heavy periods and it is noted that she does have anemia with irregular periods.  She does have low vitamin-D, patient is taking vitamin-D we will recheck her serum vitamin-D and treat as appropriate.    Past Medical History:   Diagnosis Date    Anemia, unspecified     Anxiety disorder, unspecified     Anxiety disorder, unspecified     Depression     High cholesterol     History of chest pain     Insomnia     Iron deficiency anemia, unspecified     Irregular periods         Past Surgical History:   Procedure Laterality Date     SECTION  2016    Dr. Limon        Social History     Tobacco Use    Smoking status: Former     Current packs/day: 0.00     Types: Cigarettes     Quit date: 2015     Years since quittin.9    Smokeless tobacco: Never   Substance and Sexual Activity    Alcohol use: Not Currently    Drug use: Yes     Frequency: 2.0 times per week     Types: Marijuana    Sexual activity: Yes     Partners: Male     Birth control/protection: Coitus interruptus        Current Outpatient Medications   Medication Instructions    cholecalciferol (vitamin D3) 50,000 Units, Every 7 days    paroxetine (PAXIL) 20 mg, Oral, Every morning       Review of patient's allergies indicates:  No Known Allergies     Patient Care Team:  Vivi Miller MD as PCP - General (Family Medicine)     Subjective:     Review of Systems   Constitutional:  Negative for appetite change, chills, diaphoresis and fever.   HENT:  Negative for ear pain, sinus pain and sore throat.    Eyes:  Negative for pain and visual disturbance.   Respiratory:  Negative for cough, shortness of breath and wheezing.    Cardiovascular:  Negative for chest pain, palpitations  "and leg swelling.   Gastrointestinal:  Negative for abdominal pain, blood in stool, diarrhea, nausea and vomiting.   Endocrine: Negative for cold intolerance.   Genitourinary:  Negative for difficulty urinating, dysuria, frequency and hematuria.   Musculoskeletal:  Negative for arthralgias, joint swelling and myalgias.   Skin:  Negative for color change and rash.   Allergic/Immunologic: Negative.    Neurological: Negative.  Negative for dizziness, syncope, light-headedness and numbness.   Hematological: Negative.    Psychiatric/Behavioral: Negative.  Negative for dysphoric mood and suicidal ideas. The patient is not nervous/anxious.    All other systems reviewed and are negative.      12 point review of systems conducted, negative except as stated in the history of present illness. See HPI for details.    Objective:     Visit Vitals  /77   Pulse 87   Temp 98 °F (36.7 °C) (Temporal)   Resp 16   Ht 5' 6" (1.676 m)   Wt 74.4 kg (164 lb)   LMP 10/15/2024 (Approximate)   SpO2 100%   BMI 26.47 kg/m²       Physical Exam  Vitals and nursing note reviewed.   Constitutional:       General: She is not in acute distress.     Appearance: She is not ill-appearing.   HENT:      Head: Normocephalic and atraumatic.      Mouth/Throat:      Mouth: Mucous membranes are moist.      Pharynx: Oropharynx is clear.   Eyes:      General: No scleral icterus.     Extraocular Movements: Extraocular movements intact.      Conjunctiva/sclera: Conjunctivae normal.      Pupils: Pupils are equal, round, and reactive to light.   Neck:      Vascular: No carotid bruit.   Cardiovascular:      Rate and Rhythm: Normal rate and regular rhythm.      Heart sounds: No murmur heard.     No friction rub. No gallop.   Pulmonary:      Effort: Pulmonary effort is normal. No respiratory distress.      Breath sounds: Normal breath sounds. No wheezing, rhonchi or rales.   Abdominal:      General: Abdomen is flat. Bowel sounds are normal. There is no distension. "      Palpations: Abdomen is soft. There is no mass.      Tenderness: There is no abdominal tenderness.   Musculoskeletal:         General: Normal range of motion.      Cervical back: Normal range of motion and neck supple.   Skin:     General: Skin is warm and dry.   Neurological:      General: No focal deficit present.      Mental Status: She is alert.   Psychiatric:         Mood and Affect: Mood normal.         Labs Reviewed:   Labs reviewed with patient, verbalized understanding.  Chemistry:  Lab Results   Component Value Date     10/02/2024    K 3.8 10/02/2024    BUN 10.0 10/02/2024    CREATININE 0.93 10/02/2024    EGFRNORACEVR >60 10/02/2024    GLUCOSE 94 10/02/2024    CALCIUM 9.5 10/02/2024    ALKPHOS 88 10/02/2024    LABPROT 7.5 10/02/2024    LABPROT 7.5 10/02/2024    ALBUMIN 3.9 10/02/2024    ALBUMIN 3.5 10/02/2024    BILIDIR 0.2 07/27/2021    IBILI 0.30 07/27/2021    AST 20 10/02/2024    ALT 12 10/02/2024    LWNMHFKO51AN 13 (L) 05/10/2024    TSH 3.326 05/10/2024    UBIGAY3BMTC 0.91 05/10/2024        Lab Results   Component Value Date    HGBA1C 5.3 05/10/2024        Hematology:  Lab Results   Component Value Date    WBC 7.15 10/02/2024    HGB 9.9 (L) 10/02/2024    HCT 32.3 (L) 10/02/2024     10/02/2024       Lipid Panel:  Lab Results   Component Value Date    CHOL 141 05/10/2024    HDL 43 05/10/2024    LDL 74.00 05/10/2024    TRIG 120 05/10/2024    TOTALCHOLEST 3 05/10/2024        Urine:  Lab Results   Component Value Date    APPEARANCEUA Cloudy (A) 05/10/2024    SGUA >=1.030 05/10/2024    PROTEINUA Trace (A) 05/10/2024    KETONESUA Negative 05/10/2024    LEUKOCYTESUR 2+ (A) 05/10/2024    RBCUA None Seen 05/10/2024    WBCUA 50-99 (A) 05/10/2024    BACTERIA Few (A) 05/10/2024        Assessment:       ICD-10-CM ICD-9-CM   1. Encounter to establish care  Z76.89 V65.8   2. Need for hepatitis C screening test  Z11.59 V73.89   3. Low serum vitamin D  R79.89 790.6   4. Anxiety  F41.9 300.00   5.  Depression, unspecified depression type  F32.A 311        Plan:     1. Encounter to establish care  -     CBC Auto Differential; Future; Expected date: 11/13/2024  -     Comprehensive Metabolic Panel; Future; Expected date: 11/13/2024  -     Lipid Panel; Future; Expected date: 11/13/2024  -     TSH; Future; Expected date: 11/13/2024  -     Hemoglobin A1C; Future; Expected date: 11/13/2024  -     Urinalysis; Future; Expected date: 11/13/2024    2. Need for hepatitis C screening test  -     Hepatitis C Antibody; Future; Expected date: 11/13/2024    3. Low serum vitamin D  -     Vitamin D; Future; Expected date: 11/13/2024    4. Anxiety  -     paroxetine (PAXIL) 20 MG tablet; Take 1 tablet (20 mg total) by mouth every morning.  Dispense: 30 tablet; Refill: 11    5. Depression, unspecified depression type  -     paroxetine (PAXIL) 20 MG tablet; Take 1 tablet (20 mg total) by mouth every morning.  Dispense: 30 tablet; Refill: 11         Follow up in about 2 weeks (around 11/27/2024) for Labs done prior to appointment. In addition to their scheduled follow up, the patient has also been instructed to follow up on as needed basis.     No future appointments.

## 2024-11-14 ENCOUNTER — LAB VISIT (OUTPATIENT)
Dept: LAB | Facility: HOSPITAL | Age: 24
End: 2024-11-14
Attending: NURSE PRACTITIONER
Payer: MEDICAID

## 2024-11-14 DIAGNOSIS — Z76.89 ENCOUNTER TO ESTABLISH CARE: ICD-10-CM

## 2024-11-14 DIAGNOSIS — R79.89 LOW SERUM VITAMIN D: ICD-10-CM

## 2024-11-14 DIAGNOSIS — Z11.59 NEED FOR HEPATITIS C SCREENING TEST: ICD-10-CM

## 2024-11-14 LAB
ALBUMIN SERPL-MCNC: 3.9 G/DL (ref 3.5–5)
ALBUMIN/GLOB SERPL: 1 RATIO (ref 1.1–2)
ALP SERPL-CCNC: 84 UNIT/L (ref 40–150)
ALT SERPL-CCNC: 10 UNIT/L (ref 0–55)
ANION GAP SERPL CALC-SCNC: 6 MEQ/L
AST SERPL-CCNC: 19 UNIT/L (ref 5–34)
BASOPHILS # BLD AUTO: 0.02 X10(3)/MCL
BASOPHILS NFR BLD AUTO: 0.3 %
BILIRUB SERPL-MCNC: 0.4 MG/DL
BILIRUB UR QL STRIP.AUTO: NEGATIVE
BUN SERPL-MCNC: 10 MG/DL (ref 7–18.7)
CALCIUM SERPL-MCNC: 9.8 MG/DL (ref 8.4–10.2)
CHLORIDE SERPL-SCNC: 109 MMOL/L (ref 98–107)
CHOLEST SERPL-MCNC: 143 MG/DL
CHOLEST/HDLC SERPL: 3 {RATIO} (ref 0–5)
CLARITY UR: CLEAR
CO2 SERPL-SCNC: 24 MMOL/L (ref 22–29)
COLOR UR AUTO: YELLOW
CREAT SERPL-MCNC: 0.83 MG/DL (ref 0.55–1.02)
CREAT/UREA NIT SERPL: 12
EOSINOPHIL # BLD AUTO: 0.19 X10(3)/MCL (ref 0–0.9)
EOSINOPHIL NFR BLD AUTO: 2.9 %
ERYTHROCYTE [DISTWIDTH] IN BLOOD BY AUTOMATED COUNT: 18 % (ref 11.5–17)
EST. AVERAGE GLUCOSE BLD GHB EST-MCNC: 102.5 MG/DL
GFR SERPLBLD CREATININE-BSD FMLA CKD-EPI: >60 ML/MIN/1.73/M2
GLOBULIN SER-MCNC: 3.8 GM/DL (ref 2.4–3.5)
GLUCOSE SERPL-MCNC: 84 MG/DL (ref 74–100)
GLUCOSE UR QL STRIP: NEGATIVE
HBA1C MFR BLD: 5.2 %
HCT VFR BLD AUTO: 32.5 % (ref 37–47)
HDLC SERPL-MCNC: 41 MG/DL (ref 35–60)
HGB BLD-MCNC: 10.6 G/DL (ref 12–16)
HGB UR QL STRIP: NEGATIVE
IMM GRANULOCYTES # BLD AUTO: 0.01 X10(3)/MCL (ref 0–0.04)
IMM GRANULOCYTES NFR BLD AUTO: 0.2 %
KETONES UR QL STRIP: NEGATIVE
LDLC SERPL CALC-MCNC: 85 MG/DL (ref 50–140)
LEUKOCYTE ESTERASE UR QL STRIP: NEGATIVE
LYMPHOCYTES # BLD AUTO: 2.9 X10(3)/MCL (ref 0.6–4.6)
LYMPHOCYTES NFR BLD AUTO: 44.9 %
MCH RBC QN AUTO: 25.4 PG (ref 27–31)
MCHC RBC AUTO-ENTMCNC: 32.6 G/DL (ref 33–36)
MCV RBC AUTO: 77.9 FL (ref 80–94)
MONOCYTES # BLD AUTO: 0.53 X10(3)/MCL (ref 0.1–1.3)
MONOCYTES NFR BLD AUTO: 8.2 %
NEUTROPHILS # BLD AUTO: 2.81 X10(3)/MCL (ref 2.1–9.2)
NEUTROPHILS NFR BLD AUTO: 43.5 %
NITRITE UR QL STRIP: NEGATIVE
PH UR STRIP: 5.5 [PH]
PLATELET # BLD AUTO: 257 X10(3)/MCL (ref 130–400)
PMV BLD AUTO: 11.3 FL (ref 7.4–10.4)
POTASSIUM SERPL-SCNC: 3.8 MMOL/L (ref 3.5–5.1)
PROT SERPL-MCNC: 7.7 GM/DL (ref 6.4–8.3)
PROT UR QL STRIP: NEGATIVE
RBC # BLD AUTO: 4.17 X10(6)/MCL (ref 4.2–5.4)
SODIUM SERPL-SCNC: 139 MMOL/L (ref 136–145)
SP GR UR STRIP.AUTO: >=1.03 (ref 1–1.03)
TRIGL SERPL-MCNC: 85 MG/DL (ref 37–140)
TSH SERPL-ACNC: 1.85 UIU/ML (ref 0.35–4.94)
UROBILINOGEN UR STRIP-ACNC: 0.2
VLDLC SERPL CALC-MCNC: 17 MG/DL
WBC # BLD AUTO: 6.46 X10(3)/MCL (ref 4.5–11.5)

## 2024-11-14 PROCEDURE — 36415 COLL VENOUS BLD VENIPUNCTURE: CPT

## 2024-11-14 PROCEDURE — 85025 COMPLETE CBC W/AUTO DIFF WBC: CPT

## 2024-11-14 PROCEDURE — 82306 VITAMIN D 25 HYDROXY: CPT

## 2024-11-14 PROCEDURE — 81003 URINALYSIS AUTO W/O SCOPE: CPT

## 2024-11-14 PROCEDURE — 86803 HEPATITIS C AB TEST: CPT

## 2024-11-14 PROCEDURE — 80061 LIPID PANEL: CPT

## 2024-11-14 PROCEDURE — 84443 ASSAY THYROID STIM HORMONE: CPT

## 2024-11-14 PROCEDURE — 80053 COMPREHEN METABOLIC PANEL: CPT

## 2024-11-14 PROCEDURE — 83036 HEMOGLOBIN GLYCOSYLATED A1C: CPT

## 2024-11-15 LAB
25(OH)D3+25(OH)D2 SERPL-MCNC: 85 NG/ML (ref 30–80)
HCV AB SERPL QL IA: NONREACTIVE

## 2024-11-18 ENCOUNTER — TELEPHONE (OUTPATIENT)
Dept: FAMILY MEDICINE | Facility: CLINIC | Age: 24
End: 2024-11-18
Payer: MEDICAID

## 2024-11-18 NOTE — TELEPHONE ENCOUNTER
----- Message from Kayla Bradley NP sent at 11/15/2024 10:43 AM CST -----  Please inform patient of lab results.     1. Vitamin D slightly elevated.     2. Anemia is improving.     3. All other labs within normal ranges.     Rosana put for the patient to follow up in about 2 weeks to discuss labs, but she has no appointment.     Thanks for all you do,   Kayla

## 2024-12-09 ENCOUNTER — HOSPITAL ENCOUNTER (EMERGENCY)
Facility: HOSPITAL | Age: 24
Discharge: HOME | End: 2024-12-09
Attending: INTERNAL MEDICINE
Payer: MEDICAID

## 2024-12-09 VITALS
DIASTOLIC BLOOD PRESSURE: 91 MMHG | OXYGEN SATURATION: 100 % | SYSTOLIC BLOOD PRESSURE: 149 MMHG | RESPIRATION RATE: 18 BRPM | HEART RATE: 81 BPM | TEMPERATURE: 99 F | WEIGHT: 165.63 LBS | BODY MASS INDEX: 26.73 KG/M2

## 2024-12-09 DIAGNOSIS — F41.9 ANXIETY: Primary | ICD-10-CM

## 2024-12-09 DIAGNOSIS — R07.9 CHEST PAIN: ICD-10-CM

## 2024-12-09 LAB
AMPHET UR QL SCN: NEGATIVE
B-HCG UR QL: NEGATIVE
BARBITURATE SCN PRESENT UR: NEGATIVE
BENZODIAZ UR QL SCN: NEGATIVE
CANNABINOIDS UR QL SCN: POSITIVE
COCAINE UR QL SCN: NEGATIVE
FENTANYL UR QL SCN: NEGATIVE
MDMA UR QL SCN: NEGATIVE
OHS QRS DURATION: 70 MS
OHS QTC CALCULATION: 367 MS
OPIATES UR QL SCN: NEGATIVE
PCP UR QL: NEGATIVE
PH UR: 7 [PH] (ref 3–11)
SPECIFIC GRAVITY, URINE AUTO (.000) (OHS): 1.02 (ref 1–1.03)
TROPONIN I SERPL-MCNC: <0.01 NG/ML (ref 0–0.04)

## 2024-12-09 PROCEDURE — 99285 EMERGENCY DEPT VISIT HI MDM: CPT | Mod: 25

## 2024-12-09 PROCEDURE — 93005 ELECTROCARDIOGRAM TRACING: CPT

## 2024-12-09 PROCEDURE — 81025 URINE PREGNANCY TEST: CPT | Performed by: INTERNAL MEDICINE

## 2024-12-09 PROCEDURE — 84484 ASSAY OF TROPONIN QUANT: CPT | Performed by: INTERNAL MEDICINE

## 2024-12-09 PROCEDURE — 80307 DRUG TEST PRSMV CHEM ANLYZR: CPT | Performed by: INTERNAL MEDICINE

## 2024-12-09 PROCEDURE — 93010 ELECTROCARDIOGRAM REPORT: CPT | Mod: ,,, | Performed by: STUDENT IN AN ORGANIZED HEALTH CARE EDUCATION/TRAINING PROGRAM

## 2024-12-09 NOTE — ED PROVIDER NOTES
Encounter Date: 2024       History     Chief Complaint   Patient presents with    Chest Pain     L sided chest pain x15 mins.      24-year-old black female with a history of anxiety got her results on the portal and saw high next and wanted her cholesterol numbers and began to have anxiety about it started having chest pain so she came to the ER because she thought her cholesterol was elevated due to her getting her results in the portal before talking to her primary care physician      Review of patient's allergies indicates:  No Known Allergies  Past Medical History:   Diagnosis Date    Anemia, unspecified     Anxiety disorder, unspecified     Anxiety disorder, unspecified     Depression     High cholesterol     History of chest pain     Insomnia     Iron deficiency anemia, unspecified     Irregular periods      Past Surgical History:   Procedure Laterality Date     SECTION  2016    Dr. Limon     Family History   Problem Relation Name Age of Onset    Diabetes Mother      Hypertension Mother      Dementia Mother  64    Hypertension Father      Diabetes Father      Hyperlipidemia Father      Lung cancer Paternal Uncle      Breast cancer Neg Hx      Cervical cancer Neg Hx      Colon cancer Neg Hx      Ovarian cancer Neg Hx      Uterine cancer Neg Hx       Social History     Tobacco Use    Smoking status: Former     Current packs/day: 0.00     Types: Cigarettes     Quit date: 2015     Years since quittin.0    Smokeless tobacco: Never   Substance Use Topics    Alcohol use: Not Currently    Drug use: Yes     Frequency: 2.0 times per week     Types: Marijuana     Review of Systems   Constitutional: Negative.  Negative for activity change, appetite change, chills, diaphoresis, fatigue, fever and unexpected weight change.   HENT: Negative.  Negative for congestion, dental problem, drooling, ear discharge, ear pain, facial swelling, hearing loss, mouth sores, nosebleeds, postnasal drip,  rhinorrhea, sinus pressure, sinus pain, sneezing, sore throat, tinnitus, trouble swallowing and voice change.    Eyes: Negative.  Negative for photophobia, pain, discharge, redness, itching and visual disturbance.   Respiratory: Negative.  Negative for apnea, cough, choking, chest tightness, shortness of breath, wheezing and stridor.    Cardiovascular: Negative.  Negative for chest pain, palpitations and leg swelling.   Gastrointestinal: Negative.  Negative for abdominal distention, abdominal pain, anal bleeding, blood in stool, constipation, diarrhea, nausea, rectal pain and vomiting.   Endocrine: Negative.  Negative for cold intolerance, heat intolerance, polydipsia, polyphagia and polyuria.   Genitourinary: Negative.  Negative for decreased urine volume, difficulty urinating, dyspareunia, dysuria, enuresis, flank pain, frequency, genital sores, hematuria, menstrual problem, pelvic pain, urgency, vaginal bleeding, vaginal discharge and vaginal pain.   Musculoskeletal: Negative.  Negative for arthralgias, back pain, gait problem, joint swelling, myalgias, neck pain and neck stiffness.   Skin: Negative.  Negative for color change, pallor, rash and wound.   Allergic/Immunologic: Negative.  Negative for environmental allergies, food allergies and immunocompromised state.   Neurological: Negative.  Negative for dizziness, tremors, seizures, syncope, facial asymmetry, speech difficulty, weakness, light-headedness, numbness and headaches.   Hematological: Negative.  Negative for adenopathy. Does not bruise/bleed easily.   Psychiatric/Behavioral:  Negative for agitation, behavioral problems, confusion, decreased concentration, dysphoric mood, hallucinations, self-injury, sleep disturbance and suicidal ideas. The patient is nervous/anxious. The patient is not hyperactive.    All other systems reviewed and are negative.      Physical Exam     Initial Vitals [12/09/24 0017]   BP Pulse Resp Temp SpO2   (!) 158/103 85 16 98.6  °F (37 °C) 100 %      MAP       --         Physical Exam    Nursing note and vitals reviewed.  Constitutional: She appears well-developed and well-nourished. She is not diaphoretic. No distress.   HENT:   Head: Normocephalic and atraumatic. Mouth/Throat: Oropharynx is clear and moist. No oropharyngeal exudate.   Eyes: Conjunctivae and EOM are normal. Pupils are equal, round, and reactive to light. No scleral icterus.   Neck: Neck supple. No JVD present.   Normal range of motion.  Cardiovascular:  Normal rate, regular rhythm, normal heart sounds and intact distal pulses.     Exam reveals no gallop and no friction rub.       No murmur heard.  Pulmonary/Chest: Breath sounds normal. No respiratory distress. She has no wheezes. She exhibits no tenderness.   Abdominal: Abdomen is soft. Bowel sounds are normal. She exhibits no distension. There is no abdominal tenderness. There is no rebound.   Musculoskeletal:         General: Normal range of motion.      Cervical back: Normal range of motion and neck supple.     Neurological: She is alert and oriented to person, place, and time. She has normal strength.   Skin: Skin is warm and dry. Capillary refill takes less than 2 seconds.   Psychiatric: Her behavior is normal. Judgment and thought content normal. Her mood appears anxious.         ED Course   Procedures  Labs Reviewed   DRUG SCREEN, URINE (BEAKER) - Abnormal       Result Value    Amphetamines, Urine Negative      Barbiturates, Urine Negative      Benzodiazepine, Urine Negative      Cannabinoids, Urine Positive (*)     Cocaine, Urine Negative      Fentanyl, Urine Negative      MDMA, Urine Negative      Opiates, Urine Negative      Phencyclidine, Urine Negative      pH, Urine 7.0      Specific Gravity, Urine Auto 1.025      Narrative:     Cut off concentrations:    Amphetamines - 1000 ng/ml  Barbiturates - 200 ng/ml  Benzodiazepine - 200 ng/ml  Cannabinoids (THC) - 50 ng/ml  Cocaine - 300 ng/ml  Fentanyl - 1.0  ng/ml  MDMA - 500 ng/ml  Opiates - 300 ng/ml   Phencyclidine (PCP) - 25 ng/ml    Specimen submitted for drug analysis and tested for pH and specific gravity in order to evaluate sample integrity. Suspect tampering if specific gravity is <1.003 and/or pH is not within the range of 4.5 - 8.0  False negatives may result form substances such as bleach added to urine.  False positives may result for the presence of a substance with similar chemical structure to the drug or its metabolite.    This test provides only a PRELIMINARY analytical test result. A more specific alternate chemical method must be used in order to obtain a confirmed analytical result. Gas chromatography/mass spectrometry (GC/MS) is the preferred confirmatory method. Other chemical confirmation methods are available. Clinical consideration and professional judgement should be applied to any drug of abuse test result, particularly when preliminary positive results are used.    Positive results will be confirmed only at the physicians request. Unconfirmed screening results are to be used only for medical purposes (treatment).        PREGNANCY TEST, URINE RAPID - Normal    hCG Qualitative, Urine Negative     TROPONIN I - Normal    Troponin-I <0.010       EKG Readings: (Independently Interpreted)   EKG done at 12:19 a.m. on December 9, 2024 shows normal sinus rhythm with a ventricular rate of 64 beats per minute this is a normal EKG       Imaging Results              X-Ray Chest PA And Lateral (Preliminary result)  Result time 12/09/24 01:37:12      Wet Read by Dago Sykes MD (12/09/24 01:37:12, [facility] - Emergency Dept, Emergency Medicine)    No acute cardiopulmonary changes noted                                     Medications - No data to display  Medical Decision Making  24-year-old black female with chest pain.  Differential diagnosis includes but is not limited to STEMI, NSTEMI, pericarditis, pleurisy, chest wall pain,  "costochondritis, anxiety, GERD.  Workup is completely normal I have reassured patient that has alexandre she does not have high cholesterol and her troponin is negative chest x-ray and EKG are completely normal this has just anxiety and she needs to get better control of recommend she follow back up with the primary care and get medication adjustment    Problems Addressed:  Anxiety: chronic illness or injury  Chest pain: self-limited or minor problem    Amount and/or Complexity of Data Reviewed  Labs: ordered. Decision-making details documented in ED Course.  Radiology: ordered and independent interpretation performed. Decision-making details documented in ED Course.  ECG/medicine tests: ordered and independent interpretation performed. Decision-making details documented in ED Course.                                      Clinical Impression:  Final diagnoses:  [R07.9] Chest pain  [F41.9] Anxiety (Primary)          ED Disposition Condition    Discharge Stable          ED Prescriptions    None       Follow-up Information       Follow up With Specialties Details Why Contact Info    Rosana Palma FNP Family Medicine In 2 days  [address] [place] [address]  [phone]            Portions of this note have been created with voice recognition software. Occasional "wrong-words" or "sound alike" substitutions may have occurred due to inherent limitations of voice software. Please read the note carefully and recognize, using context, word substitutions may have occurred.       Dago Sykes MD  12/09/24 0137    "

## 2024-12-16 ENCOUNTER — LAB VISIT (OUTPATIENT)
Dept: LAB | Facility: HOSPITAL | Age: 24
End: 2024-12-16
Attending: NURSE PRACTITIONER
Payer: MEDICAID

## 2024-12-16 ENCOUNTER — OFFICE VISIT (OUTPATIENT)
Dept: FAMILY MEDICINE | Facility: CLINIC | Age: 24
End: 2024-12-16
Payer: MEDICAID

## 2024-12-16 DIAGNOSIS — N91.2 AMENORRHEA DUE TO DEPO PROVERA: ICD-10-CM

## 2024-12-16 DIAGNOSIS — D64.9 ANEMIA, UNSPECIFIED TYPE: ICD-10-CM

## 2024-12-16 DIAGNOSIS — N91.2 AMENORRHEA DUE TO DEPO PROVERA: Primary | ICD-10-CM

## 2024-12-16 LAB
ALBUMIN SERPL-MCNC: 3.7 G/DL (ref 3.5–5)
ALBUMIN/GLOB SERPL: 1 RATIO (ref 1.1–2)
ALP SERPL-CCNC: 92 UNIT/L (ref 40–150)
ALT SERPL-CCNC: 13 UNIT/L (ref 0–55)
ANION GAP SERPL CALC-SCNC: 7 MEQ/L
AST SERPL-CCNC: 16 UNIT/L (ref 5–34)
B-HCG FREE SERPL-ACNC: <2.42 MIU/ML
BASOPHILS # BLD AUTO: 0.02 X10(3)/MCL
BASOPHILS NFR BLD AUTO: 0.3 %
BILIRUB SERPL-MCNC: 0.3 MG/DL
BUN SERPL-MCNC: 12 MG/DL (ref 7–18.7)
CALCIUM SERPL-MCNC: 9.9 MG/DL (ref 8.4–10.2)
CHLORIDE SERPL-SCNC: 109 MMOL/L (ref 98–107)
CO2 SERPL-SCNC: 27 MMOL/L (ref 22–29)
CREAT SERPL-MCNC: 0.88 MG/DL (ref 0.55–1.02)
CREAT/UREA NIT SERPL: 14
EOSINOPHIL # BLD AUTO: 0.16 X10(3)/MCL (ref 0–0.9)
EOSINOPHIL NFR BLD AUTO: 2.6 %
ERYTHROCYTE [DISTWIDTH] IN BLOOD BY AUTOMATED COUNT: 15.8 % (ref 11.5–17)
GFR SERPLBLD CREATININE-BSD FMLA CKD-EPI: >60 ML/MIN/1.73/M2
GLOBULIN SER-MCNC: 3.8 GM/DL (ref 2.4–3.5)
GLUCOSE SERPL-MCNC: 87 MG/DL (ref 74–100)
HCT VFR BLD AUTO: 33.8 % (ref 37–47)
HGB BLD-MCNC: 10.5 G/DL (ref 12–16)
IMM GRANULOCYTES # BLD AUTO: 0.01 X10(3)/MCL (ref 0–0.04)
IMM GRANULOCYTES NFR BLD AUTO: 0.2 %
LYMPHOCYTES # BLD AUTO: 2.41 X10(3)/MCL (ref 0.6–4.6)
LYMPHOCYTES NFR BLD AUTO: 39.4 %
MCH RBC QN AUTO: 25 PG (ref 27–31)
MCHC RBC AUTO-ENTMCNC: 31.1 G/DL (ref 33–36)
MCV RBC AUTO: 80.5 FL (ref 80–94)
MONOCYTES # BLD AUTO: 0.54 X10(3)/MCL (ref 0.1–1.3)
MONOCYTES NFR BLD AUTO: 8.8 %
NEUTROPHILS # BLD AUTO: 2.97 X10(3)/MCL (ref 2.1–9.2)
NEUTROPHILS NFR BLD AUTO: 48.7 %
PLATELET # BLD AUTO: 274 X10(3)/MCL (ref 130–400)
PMV BLD AUTO: 11.6 FL (ref 7.4–10.4)
POTASSIUM SERPL-SCNC: 4.2 MMOL/L (ref 3.5–5.1)
PROT SERPL-MCNC: 7.5 GM/DL (ref 6.4–8.3)
RBC # BLD AUTO: 4.2 X10(6)/MCL (ref 4.2–5.4)
SODIUM SERPL-SCNC: 143 MMOL/L (ref 136–145)
WBC # BLD AUTO: 6.11 X10(3)/MCL (ref 4.5–11.5)

## 2024-12-16 PROCEDURE — 85025 COMPLETE CBC W/AUTO DIFF WBC: CPT

## 2024-12-16 PROCEDURE — 36415 COLL VENOUS BLD VENIPUNCTURE: CPT

## 2024-12-16 PROCEDURE — 3044F HG A1C LEVEL LT 7.0%: CPT | Mod: CPTII,95,, | Performed by: NURSE PRACTITIONER

## 2024-12-16 PROCEDURE — 84702 CHORIONIC GONADOTROPIN TEST: CPT

## 2024-12-16 PROCEDURE — 1159F MED LIST DOCD IN RCRD: CPT | Mod: CPTII,95,, | Performed by: NURSE PRACTITIONER

## 2024-12-16 PROCEDURE — 99215 OFFICE O/P EST HI 40 MIN: CPT | Mod: 95,,, | Performed by: NURSE PRACTITIONER

## 2024-12-16 PROCEDURE — 1160F RVW MEDS BY RX/DR IN RCRD: CPT | Mod: CPTII,95,, | Performed by: NURSE PRACTITIONER

## 2024-12-16 PROCEDURE — 80053 COMPREHEN METABOLIC PANEL: CPT

## 2024-12-16 NOTE — PROGRESS NOTES
Family Medicine      Patient ID: [id number]     Chief Complaint: fertility questions (LMP 24/UPT negative 24)      HPI:     This is a telemedicine note. Patient was treated using telemedicine, real time audio and video, according to [facility] protocols. Rosana FIGUEROA, conducted the visit from the [facility]. The patient participated in the visit at a non-[facility] location selected by the patient, identified below. I am licensed in the state where the patient stated they are located. The patient stated that they understood and accepted the privacy and security risks to their information at their location. This visit is not recorded.    Patient was located at the patient's home.        Tony Galindo is a 24 y.o. female here today for a telemedicine visit. She wants to be referred to GYN for assistance for fertility, she noted her first menses since stopping depo 3/2019, her cycle was 2024. She had a heavy cycle, is 2 days late today 2024.      Past Medical History:   Diagnosis Date    Anemia, unspecified     Anxiety disorder, unspecified     Anxiety disorder, unspecified     Depression     High cholesterol     History of chest pain     Insomnia     Iron deficiency anemia, unspecified     Irregular periods         Past Surgical History:   Procedure Laterality Date     SECTION  2016    Dr. Limon        Social History     Tobacco Use    Smoking status: Former     Current packs/day: 0.00     Types: Cigarettes     Quit date: 2015     Years since quittin.0    Smokeless tobacco: Never   Substance and Sexual Activity    Alcohol use: Not Currently    Drug use: Yes     Frequency: 2.0 times per week     Types: Marijuana    Sexual activity: Yes     Partners: Male     Birth control/protection: Coitus interruptus        Current Outpatient Medications   Medication Instructions    cholecalciferol (vitamin D3) 50,000 Units, Every 7 days    paroxetine (PAXIL) 20 mg, Oral,  Every morning       Review of patient's allergies indicates:  No Known Allergies     Patient Care Team:  Rosana Palma FNP as PCP - General (Family Medicine)     Subjective:     Review of Systems   Constitutional:  Negative for activity change and unexpected weight change.   HENT:  Negative for hearing loss, rhinorrhea and trouble swallowing.    Eyes:  Negative for discharge and visual disturbance.   Respiratory:  Negative for chest tightness and wheezing.    Cardiovascular:  Negative for chest pain and palpitations.   Gastrointestinal:  Negative for blood in stool, constipation, diarrhea and vomiting.   Endocrine: Negative for polydipsia and polyuria.   Genitourinary:  Positive for menstrual problem. Negative for difficulty urinating and hematuria.   Musculoskeletal:  Negative for arthralgias, joint swelling and neck pain.   Neurological:  Negative for weakness and headaches.   Psychiatric/Behavioral:  Negative for confusion and dysphoric mood.        12 point review of systems conducted, negative except as stated in the history of present illness. See HPI for details.    Objective:     Visit Vitals  LMP 11/16/2024 (Approximate)       Physical Exam    Physical Exam: LIMITED DUE TO TELEMEDICINE RESTRICTIONS.  General: Alert and oriented, No acute distress.  Head: Normocephalic, Atraumatic.  Eye: Sclera non-icteric.  Neck/Thyroid:  Full range of motion.  Respiratory: Non-labored respirations, Symmetrical chest wall expansion.  Musculoskeletal: Normal range of motion.  Integumentary:  No visible suspicious lesions or rashes. No diaphoresis.   Neurologic: No focal deficits  Psychiatric: Normal interaction, Coherent speech, Euthymic mood, Appropriate affect     Assessment:       ICD-10-CM ICD-9-CM   1. Amenorrhea due to Depo Provera  N91.2 626.0     E980.4   2. Anemia, unspecified type  D64.9 285.9        Plan:     1. Amenorrhea due to Depo Provera  Overview:  Was on depo from 1/2016 until 3/2019, had no cycle until 2022,  then the has tried the patch and the pill, and started with irregular then regular cycle starting 11/2024. She was due to start her period 12/15/2024. She is having cramping, but no fatigue, no breast tenderness, no spotting, will send to the lab for a UCG.    Orders:  -     Ambulatory referral/consult to Obstetrics / Gynecology; Future; Expected date: 12/23/2024  -     HCG, Quantitative; Future; Expected date: 12/16/2024    2. Anemia, unspecified type  -     CBC Auto Differential; Future; Expected date: 12/16/2024  -     Comprehensive Metabolic Panel; Future; Expected date: 12/16/2024         No follow-ups on file. In addition to their scheduled follow up, the patient has also been instructed to follow up on as needed basis.     Future Appointments   Date Time Provider Department Center   1/23/2025 10:30 AM Gina, Ashley E, WHNP [facility]   5/15/2025  9:15 AM Rosana Palma FNP [facility]        Video Time Documentation:  Spent 10 minutes with patient face to face discussed health concerns. More than 50% of this time was spent in counseling and coordination of care.

## 2025-01-06 ENCOUNTER — HOSPITAL ENCOUNTER (EMERGENCY)
Facility: HOSPITAL | Age: 25
Discharge: HOME | End: 2025-01-06
Attending: INTERNAL MEDICINE
Payer: MEDICAID

## 2025-01-06 VITALS
OXYGEN SATURATION: 99 % | BODY MASS INDEX: 26.86 KG/M2 | HEART RATE: 78 BPM | WEIGHT: 166.38 LBS | TEMPERATURE: 99 F | DIASTOLIC BLOOD PRESSURE: 86 MMHG | RESPIRATION RATE: 17 BRPM | SYSTOLIC BLOOD PRESSURE: 141 MMHG

## 2025-01-06 DIAGNOSIS — R05.9 COUGH: ICD-10-CM

## 2025-01-06 PROCEDURE — 99283 EMERGENCY DEPT VISIT LOW MDM: CPT | Mod: 25

## 2025-01-07 NOTE — ED PROVIDER NOTES
Encounter Date: 2025       History     Chief Complaint   Patient presents with    Cough     Dx with flu 5 days ago. C/o continued cough.      24-year-old black female states that she has got a cough since she was diagnosed with the flu 5 days ago.  She did not go to her primary care clinic she just waited and came to the emergency department.  She has not tried any over-the-counter cold or cough preparations.  She denies shortness of breath or dizziness and denies fever and states she feels much better since she is over of the flu      Review of patient's allergies indicates:  No Known Allergies  Past Medical History:   Diagnosis Date    Anemia, unspecified     Anxiety disorder, unspecified     Anxiety disorder, unspecified     Depression     High cholesterol     History of chest pain     Insomnia     Iron deficiency anemia, unspecified     Irregular periods      Past Surgical History:   Procedure Laterality Date     SECTION  2016    Dr. Limon     Family History   Problem Relation Name Age of Onset    Diabetes Mother      Hypertension Mother      Dementia Mother  64    Hypertension Father      Diabetes Father      Hyperlipidemia Father      Lung cancer Paternal Uncle      Breast cancer Neg Hx      Cervical cancer Neg Hx      Colon cancer Neg Hx      Ovarian cancer Neg Hx      Uterine cancer Neg Hx       Social History     Tobacco Use    Smoking status: Former     Current packs/day: 0.00     Types: Cigarettes     Quit date: 2015     Years since quittin.1    Smokeless tobacco: Never   Substance Use Topics    Alcohol use: Not Currently    Drug use: Yes     Frequency: 2.0 times per week     Types: Marijuana     Review of Systems   Constitutional: Negative.  Negative for activity change, appetite change, chills, diaphoresis, fatigue, fever and unexpected weight change.   HENT: Negative.  Negative for congestion, dental problem, drooling, ear discharge, ear pain, facial swelling, hearing loss,  mouth sores, nosebleeds, postnasal drip, rhinorrhea, sinus pressure, sinus pain, sneezing, sore throat, tinnitus, trouble swallowing and voice change.    Eyes: Negative.  Negative for photophobia, pain, discharge, redness, itching and visual disturbance.   Respiratory:  Positive for cough. Negative for apnea, choking, chest tightness, shortness of breath, wheezing and stridor.    Cardiovascular: Negative.  Negative for chest pain, palpitations and leg swelling.   Gastrointestinal: Negative.  Negative for abdominal distention, abdominal pain, anal bleeding, blood in stool, constipation, diarrhea, nausea, rectal pain and vomiting.   Endocrine: Negative.  Negative for cold intolerance, heat intolerance, polydipsia, polyphagia and polyuria.   Genitourinary: Negative.  Negative for decreased urine volume, difficulty urinating, dyspareunia, dysuria, enuresis, flank pain, frequency, genital sores, hematuria, menstrual problem, pelvic pain, urgency, vaginal bleeding, vaginal discharge and vaginal pain.   Musculoskeletal: Negative.  Negative for arthralgias, back pain, gait problem, joint swelling, myalgias, neck pain and neck stiffness.   Skin: Negative.  Negative for color change, pallor, rash and wound.   Allergic/Immunologic: Negative.  Negative for environmental allergies, food allergies and immunocompromised state.   Neurological: Negative.  Negative for dizziness, tremors, seizures, syncope, facial asymmetry, speech difficulty, weakness, light-headedness, numbness and headaches.   Hematological: Negative.  Negative for adenopathy. Does not bruise/bleed easily.   Psychiatric/Behavioral: Negative.  Negative for agitation, behavioral problems, confusion, decreased concentration, dysphoric mood, hallucinations, self-injury, sleep disturbance and suicidal ideas. The patient is not nervous/anxious and is not hyperactive.    All other systems reviewed and are negative.      Physical Exam     Initial Vitals [01/06/25 1931]    BP Pulse Resp Temp SpO2   (!) 142/87 77 16 98.5 °F (36.9 °C) 100 %      MAP       --         Physical Exam    Nursing note and vitals reviewed.  Constitutional: She appears well-developed and well-nourished. She is not diaphoretic. No distress.   HENT:   Head: Normocephalic and atraumatic. Mouth/Throat: Oropharynx is clear and moist. No oropharyngeal exudate.   Eyes: Conjunctivae and EOM are normal. Pupils are equal, round, and reactive to light. No scleral icterus.   Neck: Neck supple. No JVD present.   Normal range of motion.  Cardiovascular:  Normal rate, regular rhythm, normal heart sounds and intact distal pulses.     Exam reveals no gallop and no friction rub.       No murmur heard.  Pulmonary/Chest: Breath sounds normal. No respiratory distress. She has no wheezes. She exhibits no tenderness.   Abdominal: Abdomen is soft. Bowel sounds are normal. She exhibits no distension. There is no abdominal tenderness. There is no rebound.   Musculoskeletal:         General: Normal range of motion.      Cervical back: Normal range of motion and neck supple.     Neurological: She is alert and oriented to person, place, and time. She has normal strength.   Skin: Skin is warm and dry. Capillary refill takes less than 2 seconds.   Psychiatric: She has a normal mood and affect. Her behavior is normal. Judgment and thought content normal.         ED Course   Procedures  Labs Reviewed - No data to display       Imaging Results              X-Ray Chest PA And Lateral (Preliminary result)  Result time 01/06/25 19:49:45      Wet Read by Dago Sykes MD (01/06/25 19:49:45, [facility] Emergency Dept, Emergency Medicine)    No acute cardiopulmonary changes noted                                     Medications - No data to display  Medical Decision Making  24-year-old black female with complaints of a cough.  Differential diagnosis includes but is not limited to postnasal drip, pneumonia, pneumothorax, viral  "syndrome, hemothorax, diaphragm irritation.  Exam is completely benign patient is speaking freely in his not coughed the entire time here in the emergency department I have ordered a chest x-ray which is completely normal reassured her and explained that she can take over-the-counter cold preps as needed and if this continues I recommend she follow up with the primary care    Problems Addressed:  Cough: self-limited or minor problem    Amount and/or Complexity of Data Reviewed  Radiology: ordered and independent interpretation performed. Decision-making details documented in ED Course.                                      Clinical Impression:  Final diagnoses:  [R05.9] Cough          ED Disposition Condition    Discharge Stable          ED Prescriptions    None       Follow-up Information       Follow up With Specialties Details Why Contact Info    Rosana Palma, [facility] In 2 days  [address] [place] [address]  [phone]            Portions of this note have been created with voice recognition software. Occasional "wrong-words" or "sound alike" substitutions may have occurred due to inherent limitations of voice software. Please read the note carefully and recognize, using context, word substitutions may have occurred.       Dago Sykes MD  01/06/25 1950    "

## 2025-02-10 ENCOUNTER — HOSPITAL ENCOUNTER (EMERGENCY)
Facility: HOSPITAL | Age: 25
Discharge: HOME | End: 2025-02-10
Attending: INTERNAL MEDICINE
Payer: MEDICAID

## 2025-02-10 VITALS
BODY MASS INDEX: 26.68 KG/M2 | SYSTOLIC BLOOD PRESSURE: 113 MMHG | OXYGEN SATURATION: 100 % | RESPIRATION RATE: 15 BRPM | DIASTOLIC BLOOD PRESSURE: 76 MMHG | WEIGHT: 166 LBS | TEMPERATURE: 98 F | HEART RATE: 81 BPM | HEIGHT: 66 IN

## 2025-02-10 DIAGNOSIS — S05.8X1A EYE TRAUMA, SUPERFICIAL, RIGHT, INITIAL ENCOUNTER: Primary | ICD-10-CM

## 2025-02-10 PROCEDURE — 99282 EMERGENCY DEPT VISIT SF MDM: CPT

## 2025-02-10 NOTE — PROGRESS NOTES
Patient contacted, states she is going back to eye clinic to complete eye exam will pay out of  pocket.  Instructed to contact me when eye exam completed, will arrange f/u at [facility] Larissa Boyle if needed.
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
## 2025-02-11 ENCOUNTER — OFFICE VISIT (OUTPATIENT)
Dept: FAMILY MEDICINE | Facility: CLINIC | Age: 25
End: 2025-02-11
Payer: MEDICAID

## 2025-02-11 ENCOUNTER — OFFICE VISIT (OUTPATIENT)
Dept: OPHTHALMOLOGY | Facility: CLINIC | Age: 25
End: 2025-02-11
Payer: MEDICAID

## 2025-02-11 VITALS — BODY MASS INDEX: 26.68 KG/M2 | HEIGHT: 66 IN | WEIGHT: 166 LBS

## 2025-02-11 DIAGNOSIS — S05.8X1A BLUNT TRAUMA OF RIGHT EYE, INITIAL ENCOUNTER: Primary | ICD-10-CM

## 2025-02-11 DIAGNOSIS — H33.321 RETINAL HOLE OF RIGHT EYE: Primary | ICD-10-CM

## 2025-02-11 PROCEDURE — 98005 SYNCH AUDIO-VIDEO EST LOW 20: CPT | Mod: 95,,, | Performed by: NURSE PRACTITIONER

## 2025-02-11 PROCEDURE — 99212 OFFICE O/P EST SF 10 MIN: CPT | Mod: PBBFAC,PN

## 2025-02-11 PROCEDURE — 1160F RVW MEDS BY RX/DR IN RCRD: CPT | Mod: CPTII,95,, | Performed by: NURSE PRACTITIONER

## 2025-02-11 PROCEDURE — 92134 CPTRZ OPH DX IMG PST SGM RTA: CPT | Mod: PBBFAC,PN | Performed by: OPHTHALMOLOGY

## 2025-02-11 PROCEDURE — G2211 COMPLEX E/M VISIT ADD ON: HCPCS | Mod: 95,,, | Performed by: NURSE PRACTITIONER

## 2025-02-11 PROCEDURE — 1159F MED LIST DOCD IN RCRD: CPT | Mod: CPTII,95,, | Performed by: NURSE PRACTITIONER

## 2025-02-11 RX ORDER — DOXYCYCLINE 100 MG/1
100 CAPSULE ORAL 2 TIMES DAILY
COMMUNITY
Start: 2025-01-03
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
## 2025-02-14 ENCOUNTER — OFFICE VISIT (OUTPATIENT)
Dept: OPHTHALMOLOGY | Facility: CLINIC | Age: 25
End: 2025-02-14
Payer: MEDICAID

## 2025-02-14 VITALS — WEIGHT: 166 LBS | HEIGHT: 66 IN | BODY MASS INDEX: 26.68 KG/M2

## 2025-02-14 DIAGNOSIS — H33.321 RETINAL HOLE OF RIGHT EYE: Primary | ICD-10-CM

## 2025-02-14 PROCEDURE — 99213 OFFICE O/P EST LOW 20 MIN: CPT | Mod: PBBFAC,PN

## 2025-02-14 RX ORDER — PHENYLEPH/TROPICAMIDE IN WATER 2.5 %-1 %
1 DROPS OPHTHALMIC (EYE) ONCE
Status: COMPLETED | OUTPATIENT
Start: 2025-02-14 | End: 2025-02-14

## 2025-02-14 RX ADMIN — Medication 1 DROP: at 12:02
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
## 2025-02-24 ENCOUNTER — OFFICE VISIT (OUTPATIENT)
Dept: FAMILY MEDICINE | Facility: CLINIC | Age: 25
End: 2025-02-24
Payer: MEDICAID

## 2025-02-24 ENCOUNTER — LAB VISIT (OUTPATIENT)
Dept: LAB | Facility: HOSPITAL | Age: 25
End: 2025-02-24
Attending: FAMILY MEDICINE
Payer: MEDICAID

## 2025-02-24 VITALS
WEIGHT: 165.38 LBS | SYSTOLIC BLOOD PRESSURE: 100 MMHG | BODY MASS INDEX: 26.58 KG/M2 | HEIGHT: 66 IN | HEART RATE: 78 BPM | TEMPERATURE: 98 F | OXYGEN SATURATION: 98 % | RESPIRATION RATE: 18 BRPM | DIASTOLIC BLOOD PRESSURE: 62 MMHG

## 2025-02-24 DIAGNOSIS — Z01.818 PRE-OP EXAMINATION: ICD-10-CM

## 2025-02-24 DIAGNOSIS — Z01.818 PRE-OP EXAMINATION: Primary | ICD-10-CM

## 2025-02-24 LAB
ALBUMIN SERPL-MCNC: 4.2 G/DL (ref 3.5–5)
ALBUMIN/GLOB SERPL: 1 RATIO (ref 1.1–2)
ALP SERPL-CCNC: 91 UNIT/L (ref 40–150)
ALT SERPL-CCNC: 11 UNIT/L (ref 0–55)
ANION GAP SERPL CALC-SCNC: 8 MEQ/L
AST SERPL-CCNC: 16 UNIT/L (ref 5–34)
BASOPHILS # BLD AUTO: 0.02 X10(3)/MCL
BASOPHILS NFR BLD AUTO: 0.3 %
BILIRUB SERPL-MCNC: 0.7 MG/DL
BUN SERPL-MCNC: 9 MG/DL (ref 7–18.7)
CALCIUM SERPL-MCNC: 10.4 MG/DL (ref 8.4–10.2)
CHLORIDE SERPL-SCNC: 105 MMOL/L (ref 98–107)
CO2 SERPL-SCNC: 26 MMOL/L (ref 22–29)
CREAT SERPL-MCNC: 0.78 MG/DL (ref 0.55–1.02)
CREAT/UREA NIT SERPL: 12
EOSINOPHIL # BLD AUTO: 0.1 X10(3)/MCL (ref 0–0.9)
EOSINOPHIL NFR BLD AUTO: 1.6 %
ERYTHROCYTE [DISTWIDTH] IN BLOOD BY AUTOMATED COUNT: 15.3 % (ref 11.5–17)
GFR SERPLBLD CREATININE-BSD FMLA CKD-EPI: >60 ML/MIN/1.73/M2
GLOBULIN SER-MCNC: 4.2 GM/DL (ref 2.4–3.5)
GLUCOSE SERPL-MCNC: 95 MG/DL (ref 74–100)
HCT VFR BLD AUTO: 37.7 % (ref 37–47)
HGB BLD-MCNC: 11.8 G/DL (ref 12–16)
IMM GRANULOCYTES # BLD AUTO: 0.01 X10(3)/MCL (ref 0–0.04)
IMM GRANULOCYTES NFR BLD AUTO: 0.2 %
LYMPHOCYTES # BLD AUTO: 2.44 X10(3)/MCL (ref 0.6–4.6)
LYMPHOCYTES NFR BLD AUTO: 39.4 %
MCH RBC QN AUTO: 26 PG (ref 27–31)
MCHC RBC AUTO-ENTMCNC: 31.3 G/DL (ref 33–36)
MCV RBC AUTO: 83 FL (ref 80–94)
MONOCYTES # BLD AUTO: 0.48 X10(3)/MCL (ref 0.1–1.3)
MONOCYTES NFR BLD AUTO: 7.8 %
NEUTROPHILS # BLD AUTO: 3.14 X10(3)/MCL (ref 2.1–9.2)
NEUTROPHILS NFR BLD AUTO: 50.7 %
NRBC BLD AUTO-RTO: 0 %
PLATELET # BLD AUTO: 270 X10(3)/MCL (ref 130–400)
PMV BLD AUTO: 11.7 FL (ref 7.4–10.4)
POTASSIUM SERPL-SCNC: 3.9 MMOL/L (ref 3.5–5.1)
PROT SERPL-MCNC: 8.4 GM/DL (ref 6.4–8.3)
RBC # BLD AUTO: 4.54 X10(6)/MCL (ref 4.2–5.4)
SODIUM SERPL-SCNC: 139 MMOL/L (ref 136–145)
WBC # BLD AUTO: 6.19 X10(3)/MCL (ref 4.5–11.5)

## 2025-02-24 PROCEDURE — 85025 COMPLETE CBC W/AUTO DIFF WBC: CPT

## 2025-02-24 PROCEDURE — 36415 COLL VENOUS BLD VENIPUNCTURE: CPT

## 2025-02-24 PROCEDURE — 80053 COMPREHEN METABOLIC PANEL: CPT

## 2025-02-24 NOTE — PROGRESS NOTES
"   Patient ID: [id number]     Chief Complaint: surgery clearance (" Taking a tummy tuck  . " )    HPI:     Tony Galindo is a 24 y.o. female here today for surgery clearance (" Taking a tummy tuck  . " ). Surgery is going to be in [place], [place]. She is planning on driving.     History of Present Illness               -------------------------------------    Anemia, unspecified    Anxiety disorder, unspecified    Anxiety disorder, unspecified    Depression    High cholesterol    History of chest pain    Insomnia    Iron deficiency anemia, unspecified    Irregular periods        Past Surgical History:   Procedure Laterality Date     SECTION  2016    Dr. Limon       Review of patient's allergies indicates:  No Known Allergies    No outpatient medications have been marked as taking for the 25 encounter (Office Visit) with Joaquín Trevino DO.       Social History[1]     Family History   Problem Relation Name Age of Onset    Diabetes Mother Jose Galindo     Hypertension Mother Jose Galindo     Dementia Mother Jose Galindo 64    Hypertension Father Rene Galindo     Diabetes Father Rene Galindo     Hyperlipidemia Father Rene Galindo     Lung cancer Paternal Uncle      Breast cancer Neg Hx      Cervical cancer Neg Hx      Colon cancer Neg Hx      Ovarian cancer Neg Hx      Uterine cancer Neg Hx          Patient Care Team:  Rosana Palma FNP as PCP - General (Family Medicine)     Subjective:     ROS    See HPI for details  All Other ROS: Negative except as stated in HPI.       Objective:     /62 (BP Location: Left arm, Patient Position: Sitting)   Pulse 78   Temp 98 °F (36.7 °C)   Resp 18   Ht 5' 6" (1.676 m)   Wt 75 kg (165 lb 6.4 oz)   LMP 2025 (Approximate)   SpO2 98%   BMI 26.70 kg/m²     Physical Exam  Vitals reviewed.   Constitutional:       General: She is not in acute distress.     Appearance: Normal appearance.   Cardiovascular:      Rate and Rhythm: Normal " rate and regular rhythm.      Heart sounds: No murmur heard.     No friction rub. No gallop.   Pulmonary:      Effort: No respiratory distress.      Breath sounds: No wheezing, rhonchi or rales.   Musculoskeletal:         General: No swelling, tenderness or deformity.      Right lower leg: No edema.      Left lower leg: No edema.   Skin:     General: Skin is warm and dry.      Findings: No lesion or rash.   Neurological:      General: No focal deficit present.      Mental Status: She is alert.   Psychiatric:         Mood and Affect: Mood normal.         Assessment/Plan:     1. Pre-op examination  -     CBC Auto Differential; Future; Expected date: 2025  -     Comprehensive Metabolic Panel; Future; Expected date: 2025  -     EKG 12-lead; Future; Expected date: 2025      Reviewed labs and EKG. Patient cleared for surgery.     Assessment & Plan               This note was generated with the assistance of ambient listening technology. Verbal consent was obtained by the patient and accompanying visitor(s) for the recording of patient appointment to facilitate this note. I attest to having reviewed and edited the generated note for accuracy, though some syntax or spelling errors may persist. Please contact the author of this note for any clarification.     Follow up:     Follow up if symptoms worsen or fail to improve. In addition to their scheduled follow up, the patient has also been instructed to follow up on as needed basis.            [1]   Social History  Socioeconomic History    Marital status: Single   Tobacco Use    Smoking status: Former     Current packs/day: 0.00     Types: Cigarettes     Quit date: 2015     Years since quittin.2    Smokeless tobacco: Never   Substance and Sexual Activity    Alcohol use: Never    Drug use: Not Currently     Frequency: 2.0 times per week     Types: Marijuana    Sexual activity: Yes     Partners: Male     Birth control/protection: Coitus interruptus,  None     Social Drivers of Health     Financial Resource Strain: Low Risk  (2/21/2025)    Overall Financial Resource Strain (CARDIA)     Difficulty of Paying Living Expenses: Not hard at all   Food Insecurity: Food Insecurity Present (2/21/2025)    Hunger Vital Sign     Worried About Running Out of Food in the Last Year: Sometimes true     Ran Out of Food in the Last Year: Sometimes true   Transportation Needs: No Transportation Needs (2/21/2025)    PRAPARE - Transportation     Lack of Transportation (Medical): No     Lack of Transportation (Non-Medical): No   Physical Activity: Insufficiently Active (2/21/2025)    Exercise Vital Sign     Days of Exercise per Week: 2 days     Minutes of Exercise per Session: 10 min   Stress: No Stress Concern Present (2/21/2025)    [employer] - Occupational Stress Questionnaire     Feeling of Stress : Not at all   Housing Stability: Low Risk  (2/21/2025)    Housing Stability Vital Sign     Unable to Pay for Housing in the Last Year: No     Number of Times Moved in the Last Year: 0     Homeless in the Last Year: No

## 2025-03-26 ENCOUNTER — TELEPHONE (OUTPATIENT)
Dept: FAMILY MEDICINE | Facility: CLINIC | Age: 25
End: 2025-03-26
Payer: MEDICAID

## 2025-03-26 NOTE — TELEPHONE ENCOUNTER
----- Message from Tegan sent at 3/26/2025 10:02 AM CDT -----  Who Called: Tony Reynolds is requesting assistance/information from provider's office.Symptoms (please be specific):  How long has patient had these symptoms: List of preferred pharmacies on file (remove unneeded): [unfilled]If different, enter pharmacy into here including location and phone number: Preferred Method of Contact: Phone CallPatient's Preferred Phone Number on File: [phone] Best Call Back Number, if different:Additional Information: Pt called to speak with nurse about removing tummy tuck drains

## 2025-03-26 NOTE — TELEPHONE ENCOUNTER
Called patient. She stated that her surgeon said the tummy tuck drains can be removed either by the patient or she could present to her PCP for drain removal. Patient stated that she does not feel comfortable removing them on her own and would rather come into clinic for removal.     Spoke to ox and verified that she would be comfortable removing the drains for the patient. Provider wished to have drain removal instructions from patients surgeon faxed to our office to have on file before removal.    Informed patient to ask for post op drain removal instructions and orders be faxed to our office. Provided patient with our fax number to our office. She will also get on her patient portal malu and schedule a follow up appointment for the appropriate date for drains to be removed (anytime from March 31st - April 2nd per patient). Will wait to receive orders and instructions from office in [place] to correlate accuracy.

## 2025-03-31 ENCOUNTER — OFFICE VISIT (OUTPATIENT)
Dept: FAMILY MEDICINE | Facility: CLINIC | Age: 25
End: 2025-03-31
Payer: MEDICAID

## 2025-03-31 VITALS
BODY MASS INDEX: 25.84 KG/M2 | OXYGEN SATURATION: 98 % | SYSTOLIC BLOOD PRESSURE: 114 MMHG | TEMPERATURE: 98 F | HEART RATE: 84 BPM | WEIGHT: 160.81 LBS | RESPIRATION RATE: 16 BRPM | HEIGHT: 66 IN | DIASTOLIC BLOOD PRESSURE: 80 MMHG

## 2025-03-31 DIAGNOSIS — Z48.03 ENCOUNTER FOR CHANGE OR REMOVAL OF DRAINS: Primary | ICD-10-CM

## 2025-03-31 PROCEDURE — 99213 OFFICE O/P EST LOW 20 MIN: CPT | Mod: PBBFAC | Performed by: NURSE PRACTITIONER

## 2025-03-31 PROCEDURE — 99999 PR PBB SHADOW E&M-EST. PATIENT-LVL III: CPT | Mod: PBBFAC,,, | Performed by: NURSE PRACTITIONER

## 2025-03-31 PROCEDURE — 10120 INC&RMVL FB SUBQ TISS SMPL: CPT | Mod: PBBFAC | Performed by: NURSE PRACTITIONER

## 2025-03-31 RX ORDER — CYCLOBENZAPRINE HCL 10 MG
10 TABLET ORAL EVERY 8 HOURS PRN
COMMUNITY
Start: 2025-03-13

## 2025-03-31 NOTE — ASSESSMENT & PLAN NOTE
Removal of drains, bilateral drains, abdominoplasty, held in with 1 suture each. Each rachel held 1-2cc serosanguineous drainage, wound is healing well, area is cleaned with iodine and suture is clipped, and removed, right drain is removed with no issues, left drain is removed but suture is imbedded and in under the skin, 0.5ml of 2% lidocaine is used to numb the site and suture is clipped and removed, benzoine tincture is applied bilateral after cleaning with iodine, then steri strips are applied. She is reminded to care for site as per instructed by Dr. Lopez, her surgeon.

## 2025-03-31 NOTE — PROGRESS NOTES
Family Medicine    Patient ID: [id number]     Chief Complaint: drain removal and Post-op Evaluation (Post op abdomnioplasty on 3/13/2025.)      HPI:     Tony Galindo is here today for patient needs drains removed bilateral lower abdomen, abdominal plasty completed on 2025.  Patient states no drainage the past 2 days, has not been draining any fluid of the bulb the past 2 days.    Past Medical History:   Diagnosis Date    Anemia, unspecified     Anxiety disorder, unspecified     Anxiety disorder, unspecified     Depression     High cholesterol     History of chest pain     Insomnia     Iron deficiency anemia, unspecified     Irregular periods         Past Surgical History:   Procedure Laterality Date    ABDOMINOPLASTY  2025    Dr. Jerad Bond in [place]     SECTION  2016    Dr. Limon        Social History     Tobacco Use    Smoking status: Former     Current packs/day: 0.00     Types: Cigarettes     Quit date: 2015     Years since quittin.3    Smokeless tobacco: Never   Substance and Sexual Activity    Alcohol use: Never    Drug use: Not Currently     Frequency: 2.0 times per week     Types: Marijuana    Sexual activity: Yes     Partners: Male     Birth control/protection: Coitus interruptus, None        Current Outpatient Medications   Medication Instructions    cyclobenzaprine (FLEXERIL) 10 mg, Every 8 hours PRN       Review of patient's allergies indicates:  No Known Allergies     Patient Care Team:  Rosana Palma FNP as PCP - General (Family Medicine)     Subjective:     Review of Systems   Constitutional:  Negative for activity change and unexpected weight change.   HENT:  Negative for hearing loss, rhinorrhea and trouble swallowing.    Eyes:  Negative for discharge and visual disturbance.   Respiratory:  Negative for chest tightness and wheezing.    Cardiovascular:  Negative for chest pain and palpitations.   Gastrointestinal:  Negative for blood in stool, constipation,  "diarrhea and vomiting.   Endocrine: Negative for polydipsia and polyuria.   Genitourinary:  Negative for difficulty urinating, dysuria, hematuria and menstrual problem.   Musculoskeletal:  Negative for arthralgias, joint swelling and neck pain.   Skin:  Positive for wound (Lower abdomen left-to-right drains bilateral no drainage.).   Neurological:  Negative for weakness and headaches.   Psychiatric/Behavioral:  Negative for confusion and dysphoric mood.        12 point review of systems conducted, negative except as stated in the history of present illness. See HPI for details.    Objective:     Visit Vitals  /80   Pulse 84   Temp 98 °F (36.7 °C) (Temporal)   Resp 16   Ht 5' 6" (1.676 m)   Wt 72.9 kg (160 lb 12.8 oz)   LMP 03/10/2025 (Approximate)   SpO2 98%   BMI 25.95 kg/m²       Physical Exam  Vitals and nursing note reviewed.   Constitutional:       General: She is not in acute distress.     Appearance: She is not ill-appearing.   HENT:      Head: Normocephalic and atraumatic.      Mouth/Throat:      Mouth: Mucous membranes are moist.      Pharynx: Oropharynx is clear.   Eyes:      General: No scleral icterus.     Extraocular Movements: Extraocular movements intact.      Conjunctiva/sclera: Conjunctivae normal.      Pupils: Pupils are equal, round, and reactive to light.   Neck:      Vascular: No carotid bruit.   Cardiovascular:      Rate and Rhythm: Normal rate and regular rhythm.      Heart sounds: No murmur heard.     No friction rub. No gallop.   Pulmonary:      Effort: Pulmonary effort is normal. No respiratory distress.      Breath sounds: Normal breath sounds. No wheezing, rhonchi or rales.   Abdominal:      General: Abdomen is flat. Bowel sounds are normal. There is no distension.      Palpations: Abdomen is soft. There is no mass.      Tenderness: There is no abdominal tenderness.   Musculoskeletal:         General: Normal range of motion.      Cervical back: Normal range of motion and neck " supple.   Skin:     General: Skin is warm and dry.      Findings: Wound (Drains left abdomen right abdomen serosanguineous drainage 1-2 meals each tube.) present.          Neurological:      General: No focal deficit present.      Mental Status: She is alert.   Psychiatric:         Mood and Affect: Mood normal.         Labs Reviewed:   Labs reviewed with patient, verbalized understanding.  Chemistry:  Lab Results   Component Value Date     02/24/2025    K 3.9 02/24/2025    BUN 9.0 02/24/2025    CREATININE 0.78 02/24/2025    EGFRNORACEVR >60 02/24/2025    GLUCOSE 95 02/24/2025    CALCIUM 10.4 (H) 02/24/2025    ALKPHOS 91 02/24/2025    LABPROT 8.4 (H) 02/24/2025    ALBUMIN 4.2 02/24/2025    BILIDIR 0.2 07/27/2021    IBILI 0.30 07/27/2021    AST 16 02/24/2025    ALT 11 02/24/2025    TAHSIDPN91RM 85 (H) 11/14/2024    TSH 1.854 11/14/2024    NDXBMZ7YLEG 0.91 05/10/2024        Lab Results   Component Value Date    HGBA1C 5.2 11/14/2024        Hematology:  Lab Results   Component Value Date    WBC 6.19 02/24/2025    HGB 11.8 (L) 02/24/2025    HCT 37.7 02/24/2025     02/24/2025       Lipid Panel:  Lab Results   Component Value Date    CHOL 143 11/14/2024    HDL 41 11/14/2024    LDL 85.00 11/14/2024    TRIG 85 11/14/2024    TOTALCHOLEST 3 11/14/2024        Urine:  Lab Results   Component Value Date    APPEARANCEUA Clear 11/14/2024    SGUA >=1.030 11/14/2024    PROTEINUA Negative 11/14/2024    KETONESUA Negative 11/14/2024    LEUKOCYTESUR Negative 11/14/2024    RBCUA None Seen 05/10/2024    WBCUA 50-99 (A) 05/10/2024    BACTERIA Few (A) 05/10/2024        Assessment:       ICD-10-CM ICD-9-CM   1. Encounter for change or removal of drains  Z48.03 V58.49        Plan:     1. Encounter for change or removal of drains  Assessment & Plan:  Removal of drains, bilateral drains, abdominoplasty, held in with 1 suture each. Each rachel held 1-2cc serosanguineous drainage, wound is healing well, area is cleaned with iodine and  suture is clipped, and removed, right drain is removed with no issues, left drain is removed but suture is imbedded and in under the skin, 0.5ml of 2% lidocaine is used to numb the site and suture is clipped and removed, benzoine tincture is applied bilateral after cleaning with iodine, then steri strips are applied. She is reminded to care for site as per instructed by Dr. Lopez, her surgeon.                  No follow-ups on file. In addition to their scheduled follow up, the patient has also been instructed to follow up on as needed basis.     Future Appointments   Date Time Provider Department Center   5/15/2025  9:15 AM Cox, Lori, FNP [facility]   5/27/2025 10:40 AM [facility]

## 2025-05-08 DIAGNOSIS — Z11.4 ENCOUNTER FOR SCREENING FOR HIV: ICD-10-CM

## 2025-05-08 DIAGNOSIS — Z11.59 NEED FOR HEPATITIS C SCREENING TEST: ICD-10-CM

## 2025-05-08 DIAGNOSIS — Z00.00 WELLNESS EXAMINATION: Primary | ICD-10-CM

## 2025-06-03 ENCOUNTER — HOSPITAL ENCOUNTER (EMERGENCY)
Facility: HOSPITAL | Age: 25
Discharge: HOME | End: 2025-06-03
Attending: INTERNAL MEDICINE
Payer: MEDICAID

## 2025-06-03 VITALS
OXYGEN SATURATION: 99 % | DIASTOLIC BLOOD PRESSURE: 73 MMHG | RESPIRATION RATE: 20 BRPM | BODY MASS INDEX: 25.29 KG/M2 | TEMPERATURE: 99 F | WEIGHT: 157.38 LBS | HEART RATE: 70 BPM | SYSTOLIC BLOOD PRESSURE: 116 MMHG | HEIGHT: 66 IN

## 2025-06-03 DIAGNOSIS — H16.001 CORNEAL ULCER OF RIGHT EYE: ICD-10-CM

## 2025-06-03 DIAGNOSIS — H11.31 SUBCONJUNCTIVAL HEMORRHAGE OF RIGHT EYE: Primary | ICD-10-CM
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
## 2025-06-03 LAB — B-HCG UR QL: NEGATIVE

## 2025-06-03 PROCEDURE — 81025 URINE PREGNANCY TEST: CPT | Performed by: INTERNAL MEDICINE

## 2025-06-03 PROCEDURE — 96372 THER/PROPH/DIAG INJ SC/IM: CPT | Performed by: INTERNAL MEDICINE

## 2025-06-03 PROCEDURE — 99285 EMERGENCY DEPT VISIT HI MDM: CPT | Mod: 25

## 2025-06-03 PROCEDURE — 25000003 PHARM REV CODE 250: Performed by: INTERNAL MEDICINE

## 2025-06-03 PROCEDURE — 63600175 PHARM REV CODE 636 W HCPCS: Mod: JZ,TB | Performed by: INTERNAL MEDICINE

## 2025-06-03 RX ORDER — NAPROXEN 500 MG/1
500 TABLET ORAL 2 TIMES DAILY WITH MEALS
Qty: 30 TABLET | Refills: 0 | Status: SHIPPED | OUTPATIENT
Start: 2025-06-03 | End: 2025-06-18

## 2025-06-03 RX ORDER — TETRACAINE HYDROCHLORIDE 5 MG/ML
2 SOLUTION OPHTHALMIC
Status: COMPLETED | OUTPATIENT
Start: 2025-06-03 | End: 2025-06-03

## 2025-06-03 RX ORDER — KETOROLAC TROMETHAMINE 30 MG/ML
30 INJECTION, SOLUTION INTRAMUSCULAR; INTRAVENOUS
Status: COMPLETED | OUTPATIENT
Start: 2025-06-03 | End: 2025-06-03

## 2025-06-03 RX ORDER — ERYTHROMYCIN 5 MG/G
OINTMENT OPHTHALMIC
Status: COMPLETED | OUTPATIENT
Start: 2025-06-03 | End: 2025-06-03

## 2025-06-03 RX ORDER — ERYTHROMYCIN 5 MG/G
OINTMENT OPHTHALMIC EVERY 8 HOURS
Qty: 3.5 G | Refills: 0 | Status: SHIPPED | OUTPATIENT
Start: 2025-06-03

## 2025-06-03 RX ADMIN — ERYTHROMYCIN: 5 OINTMENT OPHTHALMIC at 11:06

## 2025-06-03 RX ADMIN — FLUORESCEIN SODIUM 1 EACH: 1 STRIP OPHTHALMIC at 09:06

## 2025-06-03 RX ADMIN — TETRACAINE HYDROCHLORIDE 2 DROP: 5 SOLUTION OPHTHALMIC at 09:06

## 2025-06-03 RX ADMIN — KETOROLAC TROMETHAMINE 30 MG: 30 INJECTION, SOLUTION INTRAMUSCULAR; INTRAVENOUS at 11:06

## 2025-06-04 NOTE — DISCHARGE INSTRUCTIONS
Must see an eye doctor as soon as possible for reevaluation of the eyes in detail    Use medicine as prescribed until seen by the eye doctor      Take medicines as prescribed    See your family doctor in one to 2 days for further evaluation, workup, and treatment as necessary    Avoid driving or operating machinery while taking medicines as some medicines might cause drowsiness and may cause problems. Also pain medicines have potential of being addictive  so use Pain meds specially Narcotics Sparingly.    The exam and treatment you received in Emergency Room was for an urgent problem and NOT INTENDED AS COMPLETE CARE. It is important that you FOLLOW UP with a doctor for ongoing care. If your symptoms become WORSE or you DO NOT IMPROVE and you are unable to reach your health care provider, you should RETURN to the emergency department. The Emergency Room doctor has provided a PRELIMINARY INTERPRETATION of all your STUDIES. A final interpretation may be done after you are discharged. IF A CHANGE in your diagnosis or treatment is needed WE WILL CONTACT YOU. It is critical that we have a CURRENT PHONE NUMBER FOR YOU.
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
## 2025-07-18 ENCOUNTER — HOSPITAL ENCOUNTER (EMERGENCY)
Facility: HOSPITAL | Age: 25
Discharge: HOME | End: 2025-07-18
Attending: INTERNAL MEDICINE
Payer: MEDICAID

## 2025-07-18 VITALS
TEMPERATURE: 97 F | RESPIRATION RATE: 18 BRPM | BODY MASS INDEX: 25.18 KG/M2 | HEART RATE: 92 BPM | WEIGHT: 156 LBS | SYSTOLIC BLOOD PRESSURE: 148 MMHG | DIASTOLIC BLOOD PRESSURE: 81 MMHG | OXYGEN SATURATION: 99 %

## 2025-07-18 DIAGNOSIS — S61.219A LACERATION OF FINGER: Primary | ICD-10-CM
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
## 2025-07-18 DIAGNOSIS — S61.215A LACERATION OF LEFT RING FINGER WITHOUT FOREIGN BODY WITHOUT DAMAGE TO NAIL, INITIAL ENCOUNTER: ICD-10-CM

## 2025-07-18 DIAGNOSIS — S61.213A LACERATION OF LEFT MIDDLE FINGER WITHOUT FOREIGN BODY WITHOUT DAMAGE TO NAIL, INITIAL ENCOUNTER: ICD-10-CM

## 2025-07-18 LAB — B-HCG UR QL: NEGATIVE

## 2025-07-18 PROCEDURE — 12001 RPR S/N/AX/GEN/TRNK 2.5CM/<: CPT

## 2025-07-18 PROCEDURE — 99284 EMERGENCY DEPT VISIT MOD MDM: CPT | Mod: 25

## 2025-07-18 PROCEDURE — 25000003 PHARM REV CODE 250

## 2025-07-18 PROCEDURE — 81025 URINE PREGNANCY TEST: CPT

## 2025-07-18 RX ORDER — IBUPROFEN 600 MG/1
600 TABLET, FILM COATED ORAL
Status: COMPLETED | OUTPATIENT
Start: 2025-07-18 | End: 2025-07-18

## 2025-07-18 RX ORDER — HYDROCODONE BITARTRATE AND ACETAMINOPHEN 5; 325 MG/1; MG/1
1 TABLET ORAL
Refills: 0 | Status: COMPLETED | OUTPATIENT
Start: 2025-07-18 | End: 2025-07-18

## 2025-07-18 RX ADMIN — HYDROCODONE BITARTRATE AND ACETAMINOPHEN 1 TABLET: 5; 325 TABLET ORAL at 09:07

## 2025-07-18 RX ADMIN — IBUPROFEN 600 MG: 600 TABLET, FILM COATED ORAL at 08:07
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
## 2025-07-19 NOTE — DISCHARGE INSTRUCTIONS
Keep bandage on until tomorrow. Afterwards, you may wash with mild soap and water    Glue will begin to come off on its own in about 10 days.    Follow up with your primary care provider

## 2025-07-24 ENCOUNTER — OFFICE VISIT (OUTPATIENT)
Dept: OBSTETRICS AND GYNECOLOGY | Facility: CLINIC | Age: 25
End: 2025-07-24
Payer: MEDICAID

## 2025-07-24 ENCOUNTER — LAB VISIT (OUTPATIENT)
Dept: LAB | Facility: HOSPITAL | Age: 25
End: 2025-07-24
Payer: MEDICAID

## 2025-07-24 VITALS
SYSTOLIC BLOOD PRESSURE: 104 MMHG | DIASTOLIC BLOOD PRESSURE: 74 MMHG | HEIGHT: 66 IN | BODY MASS INDEX: 25.13 KG/M2 | WEIGHT: 156.38 LBS

## 2025-07-24 DIAGNOSIS — Z01.419 WELL WOMAN EXAM WITH ROUTINE GYNECOLOGICAL EXAM: Primary | ICD-10-CM

## 2025-07-24 DIAGNOSIS — Z11.3 SCREENING EXAMINATION FOR STD (SEXUALLY TRANSMITTED DISEASE): ICD-10-CM

## 2025-07-24 DIAGNOSIS — Z00.00 WELLNESS EXAMINATION: Primary | ICD-10-CM

## 2025-07-24 DIAGNOSIS — N92.6 IRREGULAR MENSTRUAL CYCLE: ICD-10-CM

## 2025-07-24 DIAGNOSIS — Z31.9 DESIRE FOR PREGNANCY: ICD-10-CM

## 2025-07-24 DIAGNOSIS — N89.8 VAGINAL DISCHARGE: ICD-10-CM

## 2025-07-24 DIAGNOSIS — Z12.4 CERVICAL CANCER SCREENING: ICD-10-CM

## 2025-07-24 LAB
EST. AVERAGE GLUCOSE BLD GHB EST-MCNC: 105.4 MG/DL
ESTRADIOL SERPL HS-MCNC: 81 PG/ML
FSH SERPL-ACNC: 4.58 MIU/ML
HBA1C MFR BLD: 5.3 %
PROLACTIN LEVEL (OLG): 10.79 NG/ML (ref 5.18–26.53)
T4 FREE SERPL-MCNC: 0.99 NG/DL (ref 0.7–1.48)
TSH SERPL-ACNC: 1.42 UIU/ML (ref 0.35–4.94)

## 2025-07-24 PROCEDURE — 83036 HEMOGLOBIN GLYCOSYLATED A1C: CPT

## 2025-07-24 PROCEDURE — 84443 ASSAY THYROID STIM HORMONE: CPT

## 2025-07-24 PROCEDURE — 83001 ASSAY OF GONADOTROPIN (FSH): CPT

## 2025-07-24 PROCEDURE — 82627 DEHYDROEPIANDROSTERONE: CPT

## 2025-07-24 PROCEDURE — 84146 ASSAY OF PROLACTIN: CPT

## 2025-07-24 PROCEDURE — 99213 OFFICE O/P EST LOW 20 MIN: CPT | Mod: PBBFAC

## 2025-07-24 PROCEDURE — 82670 ASSAY OF TOTAL ESTRADIOL: CPT

## 2025-07-24 PROCEDURE — 36415 COLL VENOUS BLD VENIPUNCTURE: CPT

## 2025-07-24 PROCEDURE — 84402 ASSAY OF FREE TESTOSTERONE: CPT

## 2025-07-24 PROCEDURE — 99999 PR PBB SHADOW E&M-EST. PATIENT-LVL III: CPT | Mod: PBBFAC,,,

## 2025-07-24 PROCEDURE — 84439 ASSAY OF FREE THYROXINE: CPT

## 2025-07-24 NOTE — PROGRESS NOTES
Chief Complaint:   Gynecologic Exam (Pt desire to conceive. Pt states she has been trying for about a month. )     History of Present Illness:  Tony Galindo is a 25 y.o. year old  presents for her well woman exam and establishment of care. Currently relying on nothing for birth control. Currently desires pregnancy. States they began trying a months ago. Patient's last menstrual period was 2025.    Pt c/o irregular cycles stating they are about 6 week apart so it is hard for her to tract ovulation.    Cancer-related family history includes Lung cancer in her paternal uncle. There is no history of Breast cancer, Cervical cancer, Ovarian cancer, or Uterine cancer.          Gyn History:  Menstrual History  Cycle: Yes (lmp:25)  Menarche Age: 14 years  Flow Duration: 5  Flow: Normal  Interval:  (irregular)  Intermenstrual Bleeding: No  Dysmenorrhea: Yes  Dysmenorrhea Severity : Mild    Menopause  Menopause Age: 1 years    Pap History  Last pap date: 23  Result: Normal (NIL -gc -cz -tv)  HPV Vaccine Completed: Yes  HPV Vaccine Injection Type: 3 Injection Series    [place]  Sexually Active: Yes  Sexual Orientation: heterosexual  Postcoital Bleeding: No  Dyspareunia: No  STI History: Yes  STI Type: Chlamydia  Contraception: No    Breast History  Last Breast Imaging Date: No  History of Breast Biopsy: No        Review of Systems:  General/Constitutional: Chills denies. Fatigue/weakness denies. Fever denies. Night sweats denies. Hot flashes denies    Respiratory: Cough denies. Hemoptysis denies. SOB denies. Sputum production denies. Wheezing denies .   Cardiovascular: Chest pain denies. Dizziness denies. Palpitations denies. Swelling in hands/feet denies.                Breast: Dimpling denies. Breast mass denies. Breast pain/tenderness denies. Nipple discharge denies. Puckering denies.  Gastrointestinal: Abdominal pain denies. Blood in stool denies. Constipation denies. Diarrhea denies. Heartburn  "denies. Nausea denies. Vomiting denies    Genitourinary: Incontinence denies. Blood in urine denies. Frequent urination denies. Painful urination denies. Urinary urgency denies. Nocturia denies    Gynecologic: Irregular menses admits. Heavy bleeding denies. Painful menses denies. Vaginal discharge denies. Vaginal odor denies. Vaginal itching denies. Vaginal lesion denies. Pelvic pain denies. Decreased libido denies. Vulvar lesion denies. Prolapse of genital organs denies. Painful intercourse denies. Postcoital bleeding denies    Psychiatric: Depression denies. Anxiety denies.     OB History    Para Term  AB Living   1 1 1 0 0 1   SAB IAB Ectopic Multiple Live Births   0 0 0 0 1      # 1 - Date: 14, Sex: Male, Weight: None, GA: 39w0d, Type: , Low Transverse, Apgar1: None, Apgar5: None, Living: Living, Birth Comments: None      Past Medical History:   Diagnosis Date    Anemia, unspecified     Anxiety disorder, unspecified     Anxiety disorder, unspecified     Depression     High cholesterol     History of chest pain     Insomnia     Iron deficiency anemia, unspecified     Irregular periods      Current Medications[1]    Review of patient's allergies indicates:  No Known Allergies    Past Surgical History:   Procedure Laterality Date    ABDOMINOPLASTY  2025    Dr. Jerad Bond in [place]     SECTION  2016    Dr. Limon     Family History   Problem Relation Name Age of Onset    Diabetes Mother Voncille Kraut     Hypertension Mother Voncille Kraut     Dementia Mother Jose Plunkettut 64    Hypertension Father Renevitaliy Galindo     Diabetes Father Rene Kraut     Hyperlipidemia Father Rene Kraut     Lung cancer Paternal Uncle      Breast cancer Neg Hx      Cervical cancer Neg Hx      Colon cancer Neg Hx      Ovarian cancer Neg Hx      Uterine cancer Neg Hx       Social History[2]    Physical Exam:  /74 (BP Location: Right arm, Patient Position: Sitting)   Ht 5' 6" (1.676 " m)   Wt 70.9 kg (156 lb 6.4 oz)   LMP 07/02/2025   BMI 25.24 kg/m²     Chaperone: present.       General appearance: healthy, well-nourished and well-developed     Psychiatric: Orientation to time, place and person. Normal mood and affect and active, alert     Skin: Appearance: no rashes or lesions.     Neck:   Neck: supple, FROM, trachea midline. and no masses   Thyroid: no enlargement or nodules and non-tender.       Cardiovascular:   Auscultation: RRR and no murmur.   Peripheral Vascular: no varicosities, LLE edema, RLE edema, calf tenderness, and palpable cord and pedal pulses intact.     Lungs:   Respiratory effort: no intercostal retractions or accessory muscle usage.   Auscultation: no wheezing, rales/crackles, or rhonchi and clear to auscultation.     Breast:   Inspection/Palpation: no tenderness, discrete/distinct masses, skin changes, or abnormal secretions. Nipple appearance normal.     Abdomen:   Auscultation/Inspection/Palpation: no hepatomegaly, splenomegaly, masses, tenderness or CVA tenderness and soft, non-distended bowel sounds preset.    Hernia: no palpable hernias.     Female Genitalia:    Vulva: no masses, tenderness or lesions    Bladder/Urethra: no urethral discharge or mass, normal meatus, bladder non-distended.    Vagina: no tenderness, erythema, cystocele, rectocele, or vesicle(s) or ulcers +creamy white discharge present   Cervix: no discharge, no cervical lacerations noted or motion tenderness and grossly normal    Uterus: normal size and shape and midline, non-tender, and no uterine prolapse.    Adnexa/Parametria: no parametrial tenderness or mass, no adnexal tenderness or ovarian mass.     Lymph Nodes:   Palpation: non tender submandibular nodes, axillary nodes, or inguinal nodes.     Rectal Exam:   Rectum: normal perianal skin.       Assessment/Plan:  1. Well woman exam with routine gynecological exam    2. Irregular menstrual cycle  -     Prolactin; Future; Expected date:  07/24/2025  -     T4, Free; Future; Expected date: 07/24/2025  -     TSH; Future; Expected date: 07/24/2025  -     Follicle Stimulating Hormone; Future; Expected date: 07/24/2025  -     Estradiol; Future; Expected date: 07/24/2025  -     DHEA-Sulfate; Future; Expected date: 07/24/2025  -     TESTOSTERONE, FREE (DIALYSIS) AND TOTAL, LC/MS/MS; Future; Expected date: 07/24/2025  -     Hemoglobin A1C; Future; Expected date: 07/24/2025  -     MDL Sendout Test    3. Vaginal discharge  -     MDL Sendout Test    4. Desire for pregnancy    5. Screening examination for STD (sexually transmitted disease)    6. Cervical cancer screening  -     Liquid-Based Pap Smear, Screening         Pap with leuk  Recommend BSE monthly  Discussed recommendations of annual screening after age of 40 with mammogram    Explained that screening is not 100% reliable. Advised patient if she notices any changes to her breast including a lump, mass, dimpling, discharge, rash, or tenderness she should contact us immediately.     Healthy diet, exercise   Multivitamin   Seat belt   Sunscreen use   Safe sex/ STI education   Contraception evaluation: none; desires pregnancy  Gardasil evaluation: 3/3    Labs: TSH, Free T4, A1C, prolactin, Testosterone, DHEA, Estradiol, FSH    One swab for myco/urea/AV/BV/CV      Discussed preconception counseling with patient  Discussed importance of avoidance of tobacco, excessive alcohol use for self and partner.   Healthy diet, exercise.   Discussed need for PNV, Folic acid if trying to conceive  Discussed timed intercourse and ovulation kits in detail with patient   Ovulation kits to be used cycle day 11 until positive results or until onset of menses   Ovulation to typically occur 12-16 hours following positive ovulation test.   Discussed trying to conceive for 1-2 years prior to extensive infertility work up    Discussed the various types of STDs, related symptoms and the potential consequences (including effects on  fertility) of STD infections.       Reviewed ways to limit exposure and prevention techniques.       Cultures: gc/cz/tv    Labs: denied    Counseling:     A brief discussion of STD prevention was had.    Avoidance of cigarette smoking, alcohol use, and drug use was encouraged.    A healthy diet and regular exercise was stressed.    All questions were answered and the patient voiced understanding of the above issues    RTC in 2 weeks for results review and develop a POC to help regulate cycles.                          [1] No current outpatient medications on file.  [2]   Social History  Socioeconomic History    Marital status: Single   Tobacco Use    Smoking status: Former     Current packs/day: 0.00     Types: Cigarettes     Quit date: 2015     Years since quittin.6    Smokeless tobacco: Never   Substance and Sexual Activity    Alcohol use: Never    Drug use: Not Currently     Frequency: 2.0 times per week     Types: Marijuana    Sexual activity: Yes     Partners: Male     Birth control/protection: Coitus interruptus, None     Social Drivers of Health     Financial Resource Strain: Low Risk  (2025)    Overall Financial Resource Strain (CARDIA)     Difficulty of Paying Living Expenses: Not hard at all   Food Insecurity: Food Insecurity Present (2025)    Hunger Vital Sign     Worried About Running Out of Food in the Last Year: Sometimes true     Ran Out of Food in the Last Year: Sometimes true   Transportation Needs: No Transportation Needs (2025)    PRAPARE - Transportation     Lack of Transportation (Medical): No     Lack of Transportation (Non-Medical): No   Physical Activity: Insufficiently Active (2025)    Exercise Vital Sign     Days of Exercise per Week: 2 days     Minutes of Exercise per Session: 10 min   Stress: No Stress Concern Present (2025)    [employer] [place] [employer] - Occupational Stress Questionnaire     Feeling of Stress : Not at all   Housing  Stability: Low Risk  (2/21/2025)    Housing Stability Vital Sign     Unable to Pay for Housing in the Last Year: No     Number of Times Moved in the Last Year: 0     Homeless in the Last Year: No

## 2025-07-25 LAB — DHEA-S SERPL-MCNC: 181 MCG/DL (ref 83–377)

## 2025-07-29 ENCOUNTER — TELEPHONE (OUTPATIENT)
Dept: OBSTETRICS AND GYNECOLOGY | Facility: CLINIC | Age: 25
End: 2025-07-29
Payer: MEDICAID

## 2025-07-29 ENCOUNTER — LAB VISIT (OUTPATIENT)
Dept: LAB | Facility: HOSPITAL | Age: 25
End: 2025-07-29
Attending: NURSE PRACTITIONER
Payer: MEDICAID

## 2025-07-29 DIAGNOSIS — B96.89 BV (BACTERIAL VAGINOSIS): Primary | ICD-10-CM

## 2025-07-29 DIAGNOSIS — Z00.00 WELLNESS EXAMINATION: ICD-10-CM

## 2025-07-29 DIAGNOSIS — A49.1 GBS (GROUP B STREPTOCOCCUS) INFECTION: ICD-10-CM

## 2025-07-29 DIAGNOSIS — B37.9 YEAST INFECTION: ICD-10-CM

## 2025-07-29 DIAGNOSIS — N76.0 BV (BACTERIAL VAGINOSIS): Primary | ICD-10-CM

## 2025-07-29 LAB
25(OH)D3+25(OH)D2 SERPL-MCNC: 61 NG/ML (ref 30–80)
ALBUMIN SERPL-MCNC: 3.7 G/DL (ref 3.5–5)
ALBUMIN/GLOB SERPL: 0.9 RATIO (ref 1.1–2)
ALP SERPL-CCNC: 75 UNIT/L (ref 40–150)
ALT SERPL-CCNC: 9 UNIT/L (ref 0–55)
ANION GAP SERPL CALC-SCNC: 5 MEQ/L
AST SERPL-CCNC: 16 UNIT/L (ref 11–45)
BACTERIA #/AREA URNS AUTO: ABNORMAL /HPF
BASOPHILS # BLD AUTO: 0.02 X10(3)/MCL
BASOPHILS NFR BLD AUTO: 0.4 %
BILIRUB SERPL-MCNC: 0.7 MG/DL
BILIRUB UR QL STRIP.AUTO: NEGATIVE
BUN SERPL-MCNC: 9 MG/DL (ref 7–18.7)
CALCIUM SERPL-MCNC: 9.2 MG/DL (ref 8.4–10.2)
CHLORIDE SERPL-SCNC: 109 MMOL/L (ref 98–107)
CHOLEST SERPL-MCNC: 145 MG/DL
CHOLEST/HDLC SERPL: 3 {RATIO} (ref 0–5)
CLARITY UR: CLEAR
CO2 SERPL-SCNC: 24 MMOL/L (ref 22–29)
COLOR UR AUTO: YELLOW
CREAT SERPL-MCNC: 0.83 MG/DL (ref 0.55–1.02)
CREAT/UREA NIT SERPL: 11
EOSINOPHIL # BLD AUTO: 0.25 X10(3)/MCL (ref 0–0.9)
EOSINOPHIL NFR BLD AUTO: 4.6 %
ERYTHROCYTE [DISTWIDTH] IN BLOOD BY AUTOMATED COUNT: 14.5 % (ref 11.5–17)
EST. AVERAGE GLUCOSE BLD GHB EST-MCNC: 105.4 MG/DL
GFR SERPLBLD CREATININE-BSD FMLA CKD-EPI: >60 ML/MIN/1.73/M2
GLOBULIN SER-MCNC: 3.9 GM/DL (ref 2.4–3.5)
GLUCOSE SERPL-MCNC: 91 MG/DL (ref 74–100)
GLUCOSE UR QL STRIP: NEGATIVE
HBA1C MFR BLD: 5.3 %
HCT VFR BLD AUTO: 37.7 % (ref 37–47)
HDLC SERPL-MCNC: 44 MG/DL (ref 35–60)
HGB BLD-MCNC: 12 G/DL (ref 12–16)
HGB UR QL STRIP: NEGATIVE
IMM GRANULOCYTES # BLD AUTO: 0.01 X10(3)/MCL (ref 0–0.04)
IMM GRANULOCYTES NFR BLD AUTO: 0.2 %
KETONES UR QL STRIP: NEGATIVE
LDLC SERPL CALC-MCNC: 86 MG/DL (ref 50–140)
LEUKOCYTE ESTERASE UR QL STRIP: NEGATIVE
LYMPHOCYTES # BLD AUTO: 2.63 X10(3)/MCL (ref 0.6–4.6)
LYMPHOCYTES NFR BLD AUTO: 48.7 %
MCH RBC QN AUTO: 27.6 PG (ref 27–31)
MCHC RBC AUTO-ENTMCNC: 31.8 G/DL (ref 33–36)
MCV RBC AUTO: 86.7 FL (ref 80–94)
MONOCYTES # BLD AUTO: 0.42 X10(3)/MCL (ref 0.1–1.3)
MONOCYTES NFR BLD AUTO: 7.8 %
MUCOUS THREADS URNS QL MICRO: ABNORMAL /LPF
NEUTROPHILS # BLD AUTO: 2.07 X10(3)/MCL (ref 2.1–9.2)
NEUTROPHILS NFR BLD AUTO: 38.3 %
NITRITE UR QL STRIP: NEGATIVE
NRBC BLD AUTO-RTO: 0 %
PH UR STRIP: 7 [PH]
PLATELET # BLD AUTO: 235 X10(3)/MCL (ref 130–400)
PMV BLD AUTO: 11.4 FL (ref 7.4–10.4)
POTASSIUM SERPL-SCNC: 3.8 MMOL/L (ref 3.5–5.1)
PROT SERPL-MCNC: 7.6 GM/DL (ref 6.4–8.3)
PROT UR QL STRIP: ABNORMAL
RBC # BLD AUTO: 4.35 X10(6)/MCL (ref 4.2–5.4)
RBC #/AREA URNS AUTO: ABNORMAL /HPF
SODIUM SERPL-SCNC: 138 MMOL/L (ref 136–145)
SP GR UR STRIP.AUTO: 1.02 (ref 1–1.03)
SQUAMOUS #/AREA URNS AUTO: ABNORMAL /HPF
TRIGL SERPL-MCNC: 73 MG/DL (ref 37–140)
TSH SERPL-ACNC: 1.55 UIU/ML (ref 0.35–4.94)
UROBILINOGEN UR STRIP-ACNC: 1
VLDLC SERPL CALC-MCNC: 15 MG/DL
WBC # BLD AUTO: 5.4 X10(3)/MCL (ref 4.5–11.5)
WBC #/AREA URNS AUTO: ABNORMAL /HPF

## 2025-07-29 PROCEDURE — 83036 HEMOGLOBIN GLYCOSYLATED A1C: CPT

## 2025-07-29 PROCEDURE — 84443 ASSAY THYROID STIM HORMONE: CPT

## 2025-07-29 PROCEDURE — 80061 LIPID PANEL: CPT

## 2025-07-29 PROCEDURE — 80053 COMPREHEN METABOLIC PANEL: CPT

## 2025-07-29 PROCEDURE — 36415 COLL VENOUS BLD VENIPUNCTURE: CPT

## 2025-07-29 PROCEDURE — 82306 VITAMIN D 25 HYDROXY: CPT

## 2025-07-29 PROCEDURE — 81003 URINALYSIS AUTO W/O SCOPE: CPT

## 2025-07-29 PROCEDURE — 85025 COMPLETE CBC W/AUTO DIFF WBC: CPT

## 2025-07-29 NOTE — TELEPHONE ENCOUNTER
Please notify and treat for positive results on one swab:  BV: Flagyl 500mg BID x 7 days  GBS: Ampicillin 500mg QID x 7 days    She can have Diflucan 150mg q72hrs x2 doses if needed for prevention on yeast infection.  Recommend daily probiotic  Thanks!----- Message -----From: Jarad Eubanksent: 7/28/2025  12:56 PM CDTTo: Ashley Fuentes NPSubject: Edit                                        The scan below was edited by Nhung Eubanks on 07/28/2025 at 12:56; it is attached to the following: the patient

## 2025-07-30 ENCOUNTER — TELEPHONE (OUTPATIENT)
Dept: OBSTETRICS AND GYNECOLOGY | Facility: CLINIC | Age: 25
End: 2025-07-30
Payer: MEDICAID

## 2025-07-30 RX ORDER — AMPICILLIN 500 MG/1
500 CAPSULE ORAL 4 TIMES DAILY
Qty: 28 CAPSULE | Refills: 0 | Status: SHIPPED | OUTPATIENT
Start: 2025-07-30 | End: 2025-08-06

## 2025-07-30 RX ORDER — FLUCONAZOLE 150 MG/1
150 TABLET ORAL
Qty: 2 TABLET | Refills: 0 | Status: SHIPPED | OUTPATIENT
Start: 2025-07-30 | End: 2025-08-03

## 2025-07-30 RX ORDER — METRONIDAZOLE 500 MG/1
500 TABLET ORAL EVERY 12 HOURS
Qty: 14 TABLET | Refills: 0 | Status: SHIPPED | OUTPATIENT
Start: 2025-07-30 | End: 2025-08-06

## 2025-07-30 NOTE — TELEPHONE ENCOUNTER
Patient notified and educated of results and need for treatment. Medication sent to pharmacy. Pt voiced understanding.

## 2025-07-30 NOTE — TELEPHONE ENCOUNTER
----- Message from Rylee sent at 7/30/2025  3:47 PM CDT -----  Regarding: Patient advice  Contact: Tony  Patient is wanting to know if she can take all of the recently prescribed medications all together. [phone]

## 2025-08-20 ENCOUNTER — OFFICE VISIT (OUTPATIENT)
Dept: FAMILY MEDICINE | Facility: CLINIC | Age: 25
End: 2025-08-20
Payer: MEDICAID

## 2025-08-20 VITALS
DIASTOLIC BLOOD PRESSURE: 82 MMHG | HEIGHT: 66 IN | WEIGHT: 156.31 LBS | RESPIRATION RATE: 16 BRPM | HEART RATE: 87 BPM | BODY MASS INDEX: 25.12 KG/M2 | SYSTOLIC BLOOD PRESSURE: 136 MMHG | OXYGEN SATURATION: 99 % | TEMPERATURE: 98 F

## 2025-08-20 DIAGNOSIS — N91.0 DELAYED MENSES: ICD-10-CM

## 2025-08-20 DIAGNOSIS — Z00.00 WELL ADULT EXAM: Primary | ICD-10-CM

## 2025-08-20 PROCEDURE — 99999 PR PBB SHADOW E&M-EST. PATIENT-LVL III: CPT | Mod: PBBFAC,,, | Performed by: NURSE PRACTITIONER

## 2025-08-20 PROCEDURE — 99213 OFFICE O/P EST LOW 20 MIN: CPT | Mod: PBBFAC | Performed by: NURSE PRACTITIONER

## 2025-08-31 ENCOUNTER — E-VISIT (OUTPATIENT)
Dept: OBSTETRICS AND GYNECOLOGY | Facility: CLINIC | Age: 25
End: 2025-08-31
Payer: MEDICAID

## 2025-08-31 DIAGNOSIS — N92.6 IRREGULAR MENSTRUAL CYCLE: Primary | ICD-10-CM

## 2025-09-04 ENCOUNTER — HOSPITAL ENCOUNTER (OUTPATIENT)
Dept: RADIOLOGY | Facility: HOSPITAL | Age: 25
Discharge: HOME | End: 2025-09-04
Payer: MEDICAID

## 2025-09-04 ENCOUNTER — E-VISIT (OUTPATIENT)
Dept: OBSTETRICS AND GYNECOLOGY | Facility: CLINIC | Age: 25
End: 2025-09-04
Payer: MEDICAID

## 2025-09-04 DIAGNOSIS — N92.6 IRREGULAR MENSTRUAL CYCLE: Primary | ICD-10-CM

## 2025-09-04 DIAGNOSIS — N92.6 IRREGULAR MENSTRUAL CYCLE: ICD-10-CM

## 2025-09-04 PROCEDURE — 76830 TRANSVAGINAL US NON-OB: CPT | Mod: TC
